# Patient Record
Sex: FEMALE | Race: WHITE | Employment: FULL TIME | URBAN - METROPOLITAN AREA
[De-identification: names, ages, dates, MRNs, and addresses within clinical notes are randomized per-mention and may not be internally consistent; named-entity substitution may affect disease eponyms.]

---

## 2018-09-15 ENCOUNTER — HOSPITAL ENCOUNTER (OUTPATIENT)
Dept: MRI IMAGING | Age: 53
Discharge: HOME | End: 2018-09-15
Attending: INTERNAL MEDICINE
Payer: COMMERCIAL

## 2018-09-15 DIAGNOSIS — M48.07 SPINAL STENOSIS OF LUMBOSACRAL REGION: ICD-10-CM

## 2018-09-15 DIAGNOSIS — R29.898 WEAKNESS OF BOTH LEGS: ICD-10-CM

## 2018-09-15 PROCEDURE — 72158 MRI LUMBAR SPINE W/O & W/DYE: CPT

## 2018-09-15 PROCEDURE — 74011250636 HC RX REV CODE- 250/636

## 2018-09-15 PROCEDURE — A9575 INJ GADOTERATE MEGLUMI 0.1ML: HCPCS

## 2018-09-15 RX ORDER — GADOTERATE MEGLUMINE 376.9 MG/ML
14 INJECTION INTRAVENOUS
Status: COMPLETED | OUTPATIENT
Start: 2018-09-15 | End: 2018-09-15

## 2018-09-15 RX ORDER — SODIUM CHLORIDE 0.9 % (FLUSH) 0.9 %
10 SYRINGE (ML) INJECTION
Status: COMPLETED | OUTPATIENT
Start: 2018-09-15 | End: 2018-09-15

## 2018-09-15 RX ADMIN — GADOTERATE MEGLUMINE 14 ML: 376.9 INJECTION INTRAVENOUS at 11:38

## 2018-09-15 RX ADMIN — Medication 10 ML: at 11:38

## 2018-09-15 NOTE — PROGRESS NOTES
Your back looked better than I thought. I don't see any abnormality that would cause leg weakness or benefit from surgery. I certainly see why it hurts, thought, and PT and spine injections might get you relief. Do you want to go back to see Dr Keily Hyatt at the ortho office? You could probably call there and be seen in follow up without a referral.  Let me know if they say you need one. The question remains why you are losing power in your legs. Might this stem from cervical spine disease? Are you having neck pain, or nerve pain in your arms? If so, it would be wise to check an MRI of your neck. Let me know.

## 2018-10-01 ENCOUNTER — HOSPITAL ENCOUNTER (OUTPATIENT)
Dept: PHYSICAL THERAPY | Age: 53
Discharge: HOME | End: 2018-10-01
Payer: COMMERCIAL

## 2018-10-01 PROCEDURE — 97140 MANUAL THERAPY 1/> REGIONS: CPT

## 2018-10-01 PROCEDURE — 97110 THERAPEUTIC EXERCISES: CPT

## 2018-10-01 PROCEDURE — 97161 PT EVAL LOW COMPLEX 20 MIN: CPT

## 2018-10-01 NOTE — THERAPY EVALUATION
[facility] [employer]  : 1965 [facility] at [address]  [phone]   [phone]       OUTPATIENT PHYSICAL THERAPY:Initial Assessment 10/1/2018    ICD-10: Treatment Diagnosis: Cervicalgia (M54.2)   Precautions/Allergies:   Adhesive; Iodine; and Nickel   Fall Risk Score: 1 (? 5 = High Risk)  MD Orders: Eval and Treat MEDICAL/REFERRING DIAGNOSIS:  Spondylosis without myelopathy or radiculopathy, cervical region [M47.812]  Spondylolisthesis, site unspecified [M43.10]  Radiculopathy, site unspecified [M54.10]   DATE OF ONSET: six months prior  REFERRING PHYSICIAN: Leidy Flores,*  RETURN PHYSICIAN APPOINTMENT: TBD by patient      INITIAL ASSESSMENT:  Ms. Dawson Andujar has attended 1 physical therapy session including initial evaluation. Dawson Andujar presents with S/S of increased pain, decreased ROM, decreased strength, decreased functional tolerance consistent with S/S of right periscapular strain. Dawson Andujar will benefit from home exercise program, therapeutic and postural strengthening exercises, manual therapeutic techniques (ie. Distraction, SOR, myofascial release/soft tissue mobilization) as appropriate to address Carol Mireles Porfirio's current condition. Dawson Andujar will benefit from skilled PT (medically necessary) to address above deficits affecting participation in basic ADLs and overall functional tolerance. PROBLEM LIST (Impacting functional limitations):  1. Decreased Strength  2. Decreased ADL/Functional Activities  3. Decreased Transfer Abilities  4. Increased Pain  5. Decreased Activity Tolerance  6. Decreased Flexibility/Joint Mobility  7. Decreased Knowledge of Precautions  8. Decreased [place] with Home Exercise Program INTERVENTIONS PLANNED:  1. Balance Exercise  2. Bed Mobility  3. Cold  4. Cryotherapy  5. Heat  6. Home Exercise Program (HEP)  7.  Manual Therapy  8. Neuromuscular Re-education/Strengthening  9. Range of Motion (ROM)  10. Therapeutic Activites  11. Therapeutic Exercise/Strengthening  12. Transfer Training   TREATMENT PLAN:  Effective Dates: 10/1/2018 TO 1/1/2019. Frequency/Duration: up to 2 times a week for 12 weeks   GOALS: (Goals have been discussed and agreed upon with patient.)  SHORT-TERM FUNCTIONAL GOALS: Time Frame: 4 weeks  1. 62 Daphne Hoang will report <=2/10 pain to cervical spine with performance of functional spinal mobility and rotation. 2.  62 Daphne Hoang will demonstrate improved NDI score, indicating spinal improvement from 10/50 to 5/50 affecting minimal to no difficulty with performance of cervical mobility and strengthening. 3.  62 Daphne Hoang to be independent with initial HEP for cervical region and UE's.   4.  62 Daphne Hoang will improve ROM to >=90% to assist with improved function during instrumental activities of daily living. V[place]ie[place]ary will improve MMT to >=4+/5 to all UE strengthening to return to PLOF and improve functional tolerance. DISCHARGE GOALS: Time Frame: 8 weeks  1. 62 Daphne Hoang will report <=0/10 pain to cervical spine with performance of functional spinal mobility and rotation and minimal to no difficulty with such tasks. 2. 62 Daphne Hoang will demonstrate improved NDI score, indicating spinal improvement from 5/50 to 0/50 affecting minimal to no difficulty with performance of cervical mobility and strengthening. [place] to be independent with advanced HEP for cervical region and UE's. Rehabilitation Potential For Stated Goals: Good  Regarding Whitney Monroy's therapy, I certify that the treatment plan above will be carried out by a therapist or under their direction.   Thank you for this referral,  Sheridan Najera PT     Referring Physician Signature: Kylee Lu,*              Date                    HISTORY:   History of Present Injury/Illness (Reason for Referral):  Ms. Ish Dickerson reports that she began having right sided neck and scapular pain with sleep, driving, pilates, and cycling. She reports that her symptoms are increased with overhead activity and turning her neck and decreased with pain medication. She reports sleep disturbance of one to two hours nightly. She has tried chiropractic and massage with short term improvement in pain. Her goals are to be able to safely return to regular exercise and to be safe with activity. Past Medical History/Comorbidities:   Ms. Ish Dickerson  has a past medical history of Agatston coronary artery calcium score less than 100 (11/16/2011); FH: thyroid disease (4/12/2013); History of melanoma (4/12/2013); HTN (hypertension) (9/12/2012); Hyperlipidemia (10/9/2015); Hypertension; Overweight(278.02) (4/12/2013); Spinal stenosis of lumbar region (4/12/2013); and Trigeminal neuralgia (4/12/2013). Ms. Ish Dickerson  has a past surgical history that includes hx orthopaedic; pr breast surgery procedure unlisted; hx tonsillectomy; hx breast biopsy; hx other surgical (Feb 2014); and hx other surgical (2014). Social History/Living Environment:     lives in private residence with spouse  Prior Level of Function/Work/Activity:  Independent with exercise and community activities prior to recent symptom exacerbation. Current Medications:    Current Outpatient Prescriptions:     naproxen (NAPROSYN) 500 mg tablet, Take 1 Tab by mouth two (2) times daily (with meals). , Disp: 60 Tab, Rfl: 1    propranolol (INDERAL) 10 mg tablet, Take 1 Tab by mouth daily as needed. , Disp: 30 Tab, Rfl: 3    ALPRAZolam (XANAX) 0.5 mg tablet, Take 1 Tab by mouth nightly as needed.  Max Daily Amount: 0.5 mg., Disp: 30 Tab, Rfl: 5    valACYclovir (VALTREX) 1 gram tablet, TAKE ONE TABLET BY MOUTH TWICE A DAY AS NEEDED, Disp: 60 Tab, Rfl: 1    naltrexone-buPROPion (CONTRAVE) 8-90 mg TbER ER tablet, TAKE TWO TABLETS BY MOUTH TWICE A DAY, Disp: 120 Tab, Rfl: 5    OTHER, Indications: airborne origional, Disp: , Rfl:     docusate sodium (STOOL SOFTENER) 100 mg capsule, Take 100 mg by mouth two (2) times a day., Disp: , Rfl:     hydroCHLOROthiazide (HYDRODIURIL) 25 mg tablet, Take 1 Tab by mouth daily. , Disp: 90 Tab, Rfl: 3    potassium chloride (KLOR-CON) 10 mEq tablet, Take 1 Tab by mouth daily. , Disp: 90 Tab, Rfl: 3    losartan (COZAAR) 50 mg tablet, Take 1 Tab by mouth daily. , Disp: 90 Tab, Rfl: 3    ciclopirox (PENLAC) 8 % solution, Apply to nails daily after bathing, Disp: 6.6 mL, Rfl: 5    hydrocortisone valerate (WESTCORT) 0.2 % topical cream, APPLY TOPICALLY TO RASH ON FACE TWO TIMES A DAY UNTIL CLEAR THEN AS NEEDED FOR FLARES, Disp: , Rfl: 3    cholecalciferol (VITAMIN D3) 1,000 unit cap, Take 1 Cap by mouth daily. , Disp: , Rfl:     fish oil-omega-3 fatty acids 340-1,000 mg capsule, Take  by mouth daily. , Disp: , Rfl:     norethindrone-e.estradiol-iron (LO LOESTRIN FE) 1 mg-10 mcg (24)/10 mcg (2) tab, Take one tablet by mouth daily, Disp: 3 Package, Rfl: 3     Date Last Reviewed:  10/1/2018   Number of Personal Factors/Comorbidities that affect the Plan of Care: 3+: HIGH COMPLEXITY   EXAMINATION:   Observation/Orthostatic Postural Assessment: Forward head posture with bilateral rounded shoulders and left periscapular winging. Limited pec minor mobility, limited upper trap mobility, limited levator scapulae mobility that is more significant on the right t  Palpation:          TTP over the right pec minor insertion, right upper trap, and right mid-horacic paraspinals, along medial scapular border.  Limited PA glides in mid thoracic, upper thoracic, and cervical right 3-4, 4-5, 5-6  ROM:    Joint: Eval Date: 10/1/2018 Re-Assess Date: Re-Assess Date:         Cervical AROM      Flexion (% of normal): 50     Extension (% of normal): 50     L sidebending (% of normal): 75     R sidebending (% of normal): 50     L rotation (% of normal): 75     R rotation (% of normal): 50                 Pain at end-range or with AROM? Yes/No On right     Any pain with overpressure? Yes/No Yes on right       Strength:    Joint: Eval Date: 10/1/2018 Re-Assess Date:  Re-Assess Date:     RIGHT LEFT RIGHT LEFT RIGHT LEFT   Shoulder flexion:  4/5 5/5       Shoulder extension: 5/5 5/5       Shoulder abduction: 4/5 5/5       Shoulder IR: 5/5 5/5       Shoulder ER: 4/5 5/5       Scapular retraction 4/5 5/5       Horizontal abduction 4/5 5/5       Scapular elevation 4/5 5/5           Special Tests: Assessed @ Initial Visit    Spurling's Test: Positive on right. Distraction test: positve              neers impingement test     Neurological Screen: Radiating symptoms? Yes, into right shoulder blade   Reflexes and myotomes equal for bilateral UE. Functional Mobility:  Assessed @ Initial Visit. Pain with supine to sit in right scapula        Body Structures Involved:  1. Nerves  2. Joints  3. Muscles  4. Ligaments Body Functions Affected:  1. Sensory/Pain  2. Neuromusculoskeletal  3. Movement Related Activities and Participation Affected:  1. General Tasks and Demands  2. Mobility  3. Self Care  4. Domestic Life  5. Interpersonal Interactions and Relationships  6. Community, Social and [place]   Number of elements that affect the Plan of Care: 3: MODERATE COMPLEXITY   CLINICAL PRESENTATION:   Presentation: Stable and uncomplicated: LOW COMPLEXITY   CLINICAL DECISION MAKING:   Outcome Measure: Tool Used: Neck Disability Index (NDI)  Score:  Initial: 10/50  Most Recent: X/50 (Date: -- )   Interpretation of Score: The Neck Disability Index is a revised form of the Oswestry Low Back Pain Index and is designed to measure the activities of daily living in person's with neck pain. Each section is scored on a 0-5 scale, 5 representing the greatest disability. The scores of each section are added together for a total score of 50.    Score 0 1-10 11-20 21-30 31-40 41-49 50   Modifier CH CI CJ CK CL CM CN         Medical Necessity:   · Skilled intervention continues to be required due to address above deficits affecting participation in basic ADLs and overall functional tolerance. Reason for Services/Other Comments:  · Patient continues to require skilled intervention due to address above deficits affecting participation in basic ADLs and overall functional tolerance. Use of outcome tool(s) and clinical judgement create a POC that gives a: Clear prediction of patient's progress: LOW COMPLEXITY   TREATMENT:   (In addition to Assessment/Re-Assessment sessions the following treatments were rendered)  THERAPEUTIC EXERCISE: (10 minutes):  Exercises per grid below to improve mobility, strength, balance and coordination. Required minimal visual and verbal cues to promote proper body alignment and promote proper body posture. Progressed resistance, range, repetitions and complexity of movement as indicated. Date:  10/1/2018 Date:   Date:     Activity/Exercise Parameters Parameters Parameters   Levator stretch x5'     Doorway pec stretch x5'                                         MANUAL THERAPY: (15 minutes): Joint mobilization, Soft tissue mobilization and Manipulation was utilized and necessary because of the patient's restricted joint motion, painful spasm, loss of articular motion and restricted motion of soft tissue. Instrument assisted soft tissue mobilization to right periscapular region with deep pressure release for right levator. Supine manual cervical traction and sub occipital release. MODALITIES: (10 minutes):      *  Cold Pack Therapy in order to provide analgesia and relieve muscle spasm. to right periscapular region.   (Used abbreviations: MET - muscle energy technique; PNF - proprioceptive neuromuscular facilitation; NMR - neuromuscular re-education; AP - anterior to posterior; PA - posterior to anterior)    Pre-Treatment Assessment:See patient history. Treatment/Session Assessment: Patient had decreased pain with ROM after treatm  · Pain/ Symptoms: Initial:   4 Post Session:  3 ·   Compliance with Program/Exercises: Will assess as treatment progresses. · Recommendations/Intent for next treatment session: \"Next visit will focus on advancements to more challenging activities and reduction in assistance provided\".   Total Treatment Duration:  PT Patient Time In/Time Out  Time In: 1515  Time Out: [address], PT

## 2018-10-09 ENCOUNTER — HOSPITAL ENCOUNTER (OUTPATIENT)
Dept: PHYSICAL THERAPY | Age: 53
Discharge: HOME | End: 2018-10-09
Payer: COMMERCIAL

## 2018-10-09 PROCEDURE — 97110 THERAPEUTIC EXERCISES: CPT

## 2018-10-09 PROCEDURE — 97140 MANUAL THERAPY 1/> REGIONS: CPT

## 2018-10-09 NOTE — PROGRESS NOTES
Ambulatory/Rehab Services H2 Model Falls Risk Assessment    Risk Factor Pts. ·   Confusion/Disorientation/Impulsivity  []    4 ·   Symptomatic Depression  []   2 ·   Altered Elimination  []   1 ·   Dizziness/Vertigo  []   1 ·   Gender (Male)  []   1 ·   Any administered antiepileptics (anticonvulsants):  []   2 ·   Any administered benzodiazepines:  []   1 ·   Visual Impairment (specify):  []   1 ·   Portable Oxygen Use  []   1 ·   Orthostatic ? BP  []   1 ·   History of Recent Falls (within 3 mos.)  []   5     Ability to Rise from Chair (choose one) Pts. ·   Ability to rise in a single movement  []   0 ·   Pushes up, successful in one attempt  [x]   1 ·   Multiple attempts, but successful  []   3 ·   Unable to rise without assistance  []   4   Total: (5 or greater = High Risk) 1     Falls Prevention Plan:   []                Physical Limitations to Exercise (specify):   []                Mobility Assistance Device (type):   []                Exercise/Equipment Adaptation (specify):    ©2010 [facility] [employer] of [address] Patent #[id number].  Federal Law prohibits the replication, distribution or use without written permission from [employer] [employer]

## 2018-10-09 NOTE — THERAPY EVALUATION
[employer]  : 1965 [facility] at [address]  [phone]   [phone]       OUTPATIENT PHYSICAL THERAPY:Initial Assessment 10/9/2018    ICD-10: Treatment Diagnosis: Cervicalgia (M54.2)   Precautions/Allergies:   Adhesive; Iodine; and Nickel   Fall Risk Score: 1 (? 5 = High Risk)  MD Orders: Eval and Treat MEDICAL/REFERRING DIAGNOSIS:  Spondylosis without myelopathy or radiculopathy, cervical region [M47.812]  Spondylolisthesis, site unspecified [M43.10]  Radiculopathy, site unspecified [M54.10]   DATE OF ONSET: six months prior  REFERRING PHYSICIAN: Claudean Birk,*  RETURN PHYSICIAN APPOINTMENT: TBD by patient      INITIAL ASSESSMENT:  Ms. Rhina García has attended 1 physical therapy session including initial evaluation. Rhina García presents with S/S of increased pain, decreased ROM, decreased strength, decreased functional tolerance consistent with S/S of right periscapular strain. Rhina García will benefit from home exercise program, therapeutic and postural strengthening exercises, manual therapeutic techniques (ie. Distraction, SOR, myofascial release/soft tissue mobilization) as appropriate to address [employer]'s current condition. Rhina García will benefit from skilled PT (medically necessary) to address above deficits affecting participation in basic ADLs and overall functional tolerance. PROBLEM LIST (Impacting functional limitations):  1. Decreased Strength  2. Decreased ADL/Functional Activities  3. Decreased Transfer Abilities  4. Increased Pain  5. Decreased Activity Tolerance  6. Decreased Flexibility/Joint Mobility  7. Decreased Knowledge of Precautions  8. Decreased [place] with Home Exercise Program INTERVENTIONS PLANNED:  1. Balance Exercise  2. Bed Mobility  3. Cold  4. Cryotherapy  5. Heat  6. Home Exercise Program (HEP)  7.  Manual Therapy  8. Neuromuscular Re-education/Strengthening  9. Range of Motion (ROM)  10. Therapeutic Activites  11. Therapeutic Exercise/Strengthening  12. Transfer Training   TREATMENT PLAN:  Effective Dates: 10/1/2018 TO 1/1/2019. Frequency/Duration: up to 2 times a week for 12 weeks   GOALS: (Goals have been discussed and agreed upon with patient.)  SHORT-TERM FUNCTIONAL GOALS: Time Frame: 4 weeks  1. 62 Daphne Hoang will report <=2/10 pain to cervical spine with performance of functional spinal mobility and rotation. 2.  62 Daphne Hoang will demonstrate improved NDI score, indicating spinal improvement from 10/50 to 5/50 affecting minimal to no difficulty with performance of cervical mobility and strengthening. 3.  62 Daphne Hoang to be independent with initial HEP for cervical region and UE's.   4.  62 Daphne Hoang will improve ROM to >=90% to assist with improved function during instrumental activities of daily living. Radha will improve MMT to >=4+/5 to all UE strengthening to return to PLOF and improve functional tolerance. DISCHARGE GOALS: Time Frame: 8 weeks  1. 62 Daphne Hoang will report <=0/10 pain to cervical spine with performance of functional spinal mobility and rotation and minimal to no difficulty with such tasks. 2. 62 Daphne Hoang will demonstrate improved NDI score, indicating spinal improvement from 5/50 to 0/50 affecting minimal to no difficulty with performance of cervical mobility and strengthening. [place] to be independent with advanced HEP for cervical region and UE's. Rehabilitation Potential For Stated Goals: Good  Regarding Carol Monroy's therapy, I certify that the treatment plan above will be carried out by a therapist or under their direction.   Thank you for this referral,  Werner Castro, PT     Referring Physician Signature: Leidy Flores,*              Date                    HISTORY:   History of Present Injury/Illness (Reason for Referral):  Ms. Levy Mohs reports that she began having right sided neck and scapular pain with sleep, driving, pilates, and cycling. She reports that her symptoms are increased with overhead activity and turning her neck and decreased with pain medication. She reports sleep disturbance of one to two hours nightly. She has tried chiropractic and massage with short term improvement in pain. Her goals are to be able to safely return to regular exercise and to be safe with activity. Past Medical History/Comorbidities:   Ms. Levy Mohs  has a past medical history of Agatston coronary artery calcium score less than 100 (11/16/2011); FH: thyroid disease (4/12/2013); History of melanoma (4/12/2013); HTN (hypertension) (9/12/2012); Hyperlipidemia (10/9/2015); Hypertension; Overweight(278.02) (4/12/2013); Spinal stenosis of lumbar region (4/12/2013); and Trigeminal neuralgia (4/12/2013). Ms. Levy Mohs  has a past surgical history that includes hx orthopaedic; pr breast surgery procedure unlisted; hx tonsillectomy; hx breast biopsy; hx other surgical (Feb 2014); and hx other surgical (2014). Social History/Living Environment:     lives in private residence with spouse  Prior Level of Function/Work/Activity:  Independent with exercise and community activities prior to recent symptom exacerbation. Current Medications:    Current Outpatient Prescriptions:     naproxen (NAPROSYN) 500 mg tablet, Take 1 Tab by mouth two (2) times daily (with meals). , Disp: 60 Tab, Rfl: 1    propranolol (INDERAL) 10 mg tablet, Take 1 Tab by mouth daily as needed. , Disp: 30 Tab, Rfl: 3    ALPRAZolam (XANAX) 0.5 mg tablet, Take 1 Tab by mouth nightly as needed.  Max Daily Amount: 0.5 mg., Disp: 30 Tab, Rfl: 5    valACYclovir (VALTREX) 1 gram tablet, TAKE ONE TABLET BY MOUTH TWICE A DAY AS NEEDED, Disp: 60 Tab, Rfl: 1    naltrexone-buPROPion (CONTRAVE) 8-90 mg TbER ER tablet, TAKE TWO TABLETS BY MOUTH TWICE A DAY, Disp: 120 Tab, Rfl: 5    OTHER, Indications: airborne origional, Disp: , Rfl:     docusate sodium (STOOL SOFTENER) 100 mg capsule, Take 100 mg by mouth two (2) times a day., Disp: , Rfl:     hydroCHLOROthiazide (HYDRODIURIL) 25 mg tablet, Take 1 Tab by mouth daily. , Disp: 90 Tab, Rfl: 3    potassium chloride (KLOR-CON) 10 mEq tablet, Take 1 Tab by mouth daily. , Disp: 90 Tab, Rfl: 3    losartan (COZAAR) 50 mg tablet, Take 1 Tab by mouth daily. , Disp: 90 Tab, Rfl: 3    ciclopirox (PENLAC) 8 % solution, Apply to nails daily after bathing, Disp: 6.6 mL, Rfl: 5    hydrocortisone valerate (WESTCORT) 0.2 % topical cream, APPLY TOPICALLY TO RASH ON FACE TWO TIMES A DAY UNTIL CLEAR THEN AS NEEDED FOR FLARES, Disp: , Rfl: 3    cholecalciferol (VITAMIN D3) 1,000 unit cap, Take 1 Cap by mouth daily. , Disp: , Rfl:     fish oil-omega-3 fatty acids 340-1,000 mg capsule, Take  by mouth daily. , Disp: , Rfl:     norethindrone-e.estradiol-iron (LO LOESTRIN FE) 1 mg-10 mcg (24)/10 mcg (2) tab, Take one tablet by mouth daily, Disp: 3 Package, Rfl: 3     Date Last Reviewed:  10/9/2018   Number of Personal Factors/Comorbidities that affect the Plan of Care: 3+: HIGH COMPLEXITY   EXAMINATION:   Observation/Orthostatic Postural Assessment: Forward head posture with bilateral rounded shoulders and left periscapular winging. Limited pec minor mobility, limited upper trap mobility, limited levator scapulae mobility that is more significant on the right t  Palpation:          TTP over the right pec minor insertion, right upper trap, and right mid-horacic paraspinals, along medial scapular border.  Limited PA glides in mid thoracic, upper thoracic, and cervical right 3-4, 4-5, 5-6  ROM:    Joint: Eval Date: 10/1/2018 Re-Assess Date: Re-Assess Date:         Cervical AROM      Flexion (% of normal): 50     Extension (% of normal): 50     L sidebending (% of normal): 75     R sidebending (% of normal): 50     L rotation (% of normal): 75     R rotation (% of normal): 50                 Pain at end-range or with AROM? Yes/No On right     Any pain with overpressure? Yes/No Yes on right       Strength:    Joint: Eval Date: 10/1/2018 Re-Assess Date:  Re-Assess Date:     RIGHT LEFT RIGHT LEFT RIGHT LEFT   Shoulder flexion:  4/5 5/5       Shoulder extension: 5/5 5/5       Shoulder abduction: 4/5 5/5       Shoulder IR: 5/5 5/5       Shoulder ER: 4/5 5/5       Scapular retraction 4/5 5/5       Horizontal abduction 4/5 5/5       Scapular elevation 4/5 5/5           Special Tests: Assessed @ Initial Visit    Spurling's Test: Positive on right. Distraction test: positve              neers impingement test     Neurological Screen: Radiating symptoms? Yes, into right shoulder blade   Reflexes and myotomes equal for bilateral UE. Functional Mobility:  Assessed @ Initial Visit. Pain with supine to sit in right scapula        Body Structures Involved:  1. Nerves  2. Joints  3. Muscles  4. Ligaments Body Functions Affected:  1. Sensory/Pain  2. Neuromusculoskeletal  3. Movement Related Activities and Participation Affected:  1. General Tasks and Demands  2. Mobility  3. Self Care  4. Domestic Life  5. Interpersonal Interactions and Relationships  6. Community, Social and [facility]   Number of elements that affect the Plan of Care: 3: MODERATE COMPLEXITY   CLINICAL PRESENTATION:   Presentation: Stable and uncomplicated: LOW COMPLEXITY   CLINICAL DECISION MAKING:   Outcome Measure: Tool Used: Neck Disability Index (NDI)  Score:  Initial: 10/50  Most Recent: X/50 (Date: -- )   Interpretation of Score: The Neck Disability Index is a revised form of the Oswestry Low Back Pain Index and is designed to measure the activities of daily living in person's with neck pain. Each section is scored on a 0-5 scale, 5 representing the greatest disability. The scores of each section are added together for a total score of 50.    Score 0 1-10 11-20 21-30 31-40 41-49 50   Modifier CH CI CJ CK CL CM CN         Medical Necessity:   · Skilled intervention continues to be required due to address above deficits affecting participation in basic ADLs and overall functional tolerance. Reason for Services/Other Comments:  · Patient continues to require skilled intervention due to address above deficits affecting participation in basic ADLs and overall functional tolerance. Use of outcome tool(s) and clinical judgement create a POC that gives a: Clear prediction of patient's progress: LOW COMPLEXITY   TREATMENT:   (In addition to Assessment/Re-Assessment sessions the following treatments were rendered)  THERAPEUTIC EXERCISE: (15 minutes):  Exercises per grid below to improve mobility, strength, balance and coordination. Required minimal visual and verbal cues to promote proper body alignment and promote proper body posture. Progressed resistance, range, repetitions and complexity of movement as indicated. Date:  10/1/2018 Date:  10/9/2018 Date:     Activity/Exercise Parameters Parameters Parameters   Levator stretch x5' x5'    Doorway pec stretch x5' x5'    UBE  3'/3'                                  MANUAL THERAPY: (25 minutes): Joint mobilization, Soft tissue mobilization and Manipulation was utilized and necessary because of the patient's restricted joint motion, painful spasm, loss of articular motion and restricted motion of soft tissue. Instrument assisted soft tissue mobilization to right periscapular region with deep pressure release for right levator. Supine manual cervical traction and sub occipital release. MODALITIES: (10 minutes):      *  Cold Pack Therapy in order to provide analgesia and relieve muscle spasm. to right periscapular region.   (Used abbreviations: MET - muscle energy technique; PNF - proprioceptive neuromuscular facilitation; NMR - neuromuscular re-education; AP - anterior to posterior; PA - posterior to anterior)    Pre-Treatment Assessment: Patient reports improved general pain levels. Treatment/Session Assessment: Patient had decreased pain with ROM after treatment. Patient reported increased soreness after UBE that resolved with manual.  · Pain/ Symptoms: Initial:   4 Post Session:  3 ·   Compliance with Program/Exercises: Will assess as treatment progresses. · Recommendations/Intent for next treatment session: \"Next visit will focus on advancements to more challenging activities and reduction in assistance provided\".   Total Treatment Duration:  PT Patient Time In/Time Out  Time In: 1600  Time Out: 1208 [facility],

## 2018-10-10 ENCOUNTER — HOSPITAL ENCOUNTER (OUTPATIENT)
Dept: PHYSICAL THERAPY | Age: 53
Discharge: HOME | End: 2018-10-10
Payer: COMMERCIAL

## 2018-10-10 PROCEDURE — 97140 MANUAL THERAPY 1/> REGIONS: CPT

## 2018-10-10 PROCEDURE — 97110 THERAPEUTIC EXERCISES: CPT

## 2018-10-16 ENCOUNTER — HOSPITAL ENCOUNTER (OUTPATIENT)
Dept: PHYSICAL THERAPY | Age: 53
Discharge: HOME | End: 2018-10-16
Payer: COMMERCIAL

## 2018-10-16 NOTE — PROGRESS NOTES
[facility] [employer]  : 1965 [facility] at [address]  [phone]   [phone]       OUTPATIENT PHYSICAL THERAPY: 10/16/2018    ICD-10: Treatment Diagnosis: Cervicalgia (M54.2)   Precautions/Allergies:   Adhesive; Iodine; and Nickel   Fall Risk Score: 1 (? 5 = High Risk)  MD Orders: Eval and Treat MEDICAL/REFERRING DIAGNOSIS:  Spondylosis without myelopathy or radiculopathy, cervical region [M47.812]  Spondylolisthesis, site unspecified [M43.10]  Radiculopathy, site unspecified [M54.10]   DATE OF ONSET: six months prior  REFERRING PHYSICIAN: Nataly Barber,*  RETURN PHYSICIAN APPOINTMENT: TBD by patient      INITIAL ASSESSMENT:  Ms. Parth Lux has attended 1 physical therapy session including initial evaluation. Parth Lux presents with S/S of increased pain, decreased ROM, decreased strength, decreased functional tolerance consistent with S/S of right periscapular strain. Parth Lux will benefit from home exercise program, therapeutic and postural strengthening exercises, manual therapeutic techniques (ie. Distraction, SOR, myofascial release/soft tissue mobilization) as appropriate to address Nati Monroy's current condition. Parth Lux will benefit from skilled PT (medically necessary) to address above deficits affecting participation in basic ADLs and overall functional tolerance. PROBLEM LIST (Impacting functional limitations):  1. Decreased Strength  2. Decreased ADL/Functional Activities  3. Decreased Transfer Abilities  4. Increased Pain  5. Decreased Activity Tolerance  6. Decreased Flexibility/Joint Mobility  7. Decreased Knowledge of Precautions  8. Decreased [place] with Home Exercise Program INTERVENTIONS PLANNED:  1. Balance Exercise  2. Bed Mobility  3. Cold  4. Cryotherapy  5. Heat  6. Home Exercise Program (HEP)  7. Manual Therapy  8.  Neuromuscular Re-education/Strengthening  9. Range of Motion (ROM)  10. Therapeutic Activites  11. Therapeutic Exercise/Strengthening  12. Transfer Training   TREATMENT PLAN:  Effective Dates: 10/1/2018 TO 1/1/2019. Frequency/Duration: up to 2 times a week for 12 weeks   GOALS: (Goals have been discussed and agreed upon with patient.)  SHORT-TERM FUNCTIONAL GOALS: Time Frame: 4 weeks  1. 62 Daphne Hoang will report <=2/10 pain to cervical spine with performance of functional spinal mobility and rotation. 2.  62 Daphne Hoang will demonstrate improved NDI score, indicating spinal improvement from 10/50 to 5/50 affecting minimal to no difficulty with performance of cervical mobility and strengthening. 3.  62 Daphne Hoang to be independent with initial HEP for cervical region and UE's.   4.  62 Daphne Hoang will improve ROM to >=90% to assist with improved function during instrumental activities of daily living. Vamsieyzackary will improve MMT to >=4+/5 to all UE strengthening to return to PLOF and improve functional tolerance. DISCHARGE GOALS: Time Frame: 8 weeks  1. Beryl Hoang will report <=0/10 pain to cervical spine with performance of functional spinal mobility and rotation and minimal to no difficulty with such tasks. 2. 62 Daphne Hoang will demonstrate improved NDI score, indicating spinal improvement from 5/50 to 0/50 affecting minimal to no difficulty with performance of cervical mobility and strengthening. [place] to be independent with advanced HEP for cervical region and UE's. Rehabilitation Potential For Stated Goals: Good  Regarding Charmaine Monroy's therapy, I certify that the treatment plan above will be carried out by a therapist or under their direction.   Thank you for this referral,  Theresa Holley, PT     Referring Physician Signature: Bell Vallejo,*              Date                    HISTORY:   History of Present Injury/Illness (Reason for Referral):  Ms. Raissa Liao reports that she began having right sided neck and scapular pain with sleep, driving, pilates, and cycling. She reports that her symptoms are increased with overhead activity and turning her neck and decreased with pain medication. She reports sleep disturbance of one to two hours nightly. She has tried chiropractic and massage with short term improvement in pain. Her goals are to be able to safely return to regular exercise and to be safe with activity. Past Medical History/Comorbidities:   Ms. Raissa Liao  has a past medical history of [place] coronary artery calcium score less than 100 (11/16/2011); FH: thyroid disease (4/12/2013); History of melanoma (4/12/2013); HTN (hypertension) (9/12/2012); Hyperlipidemia (10/9/2015); Hypertension; Overweight(278.02) (4/12/2013); Spinal stenosis of lumbar region (4/12/2013); and Trigeminal neuralgia (4/12/2013). Ms. Raissa Liao  has a past surgical history that includes hx orthopaedic; pr breast surgery procedure unlisted; hx tonsillectomy; hx breast biopsy; hx other surgical (Feb 2014); and hx other surgical (2014). Social History/Living Environment:     lives in private residence with spouse  Prior Level of Function/Work/Activity:  Independent with exercise and community activities prior to recent symptom exacerbation. Current Medications:    Current Outpatient Prescriptions:     naproxen (NAPROSYN) 500 mg tablet, Take 1 Tab by mouth two (2) times daily (with meals). , Disp: 60 Tab, Rfl: 1    propranolol (INDERAL) 10 mg tablet, Take 1 Tab by mouth daily as needed. , Disp: 30 Tab, Rfl: 3    ALPRAZolam (XANAX) 0.5 mg tablet, Take 1 Tab by mouth nightly as needed.  Max Daily Amount: 0.5 mg., Disp: 30 Tab, Rfl: 5    valACYclovir (VALTREX) 1 gram tablet, TAKE ONE TABLET BY MOUTH TWICE A DAY AS NEEDED, Disp: 60 Tab, Rfl: 1    naltrexone-buPROPion (CONTRAVE) 8-90 mg TbER ER tablet, TAKE TWO TABLETS BY MOUTH TWICE A DAY, Disp: 120 Tab, Rfl: 5   OTHER, Indications: airborne origional, Disp: , Rfl:     docusate sodium (STOOL SOFTENER) 100 mg capsule, Take 100 mg by mouth two (2) times a day., Disp: , Rfl:     hydroCHLOROthiazide (HYDRODIURIL) 25 mg tablet, Take 1 Tab by mouth daily. , Disp: 90 Tab, Rfl: 3    potassium chloride (KLOR-CON) 10 mEq tablet, Take 1 Tab by mouth daily. , Disp: 90 Tab, Rfl: 3    losartan (COZAAR) 50 mg tablet, Take 1 Tab by mouth daily. , Disp: 90 Tab, Rfl: 3    ciclopirox (PENLAC) 8 % solution, Apply to nails daily after bathing, Disp: 6.6 mL, Rfl: 5    hydrocortisone valerate (WESTCORT) 0.2 % topical cream, APPLY TOPICALLY TO RASH ON FACE TWO TIMES A DAY UNTIL CLEAR THEN AS NEEDED FOR FLARES, Disp: , Rfl: 3    cholecalciferol (VITAMIN D3) 1,000 unit cap, Take 1 Cap by mouth daily. , Disp: , Rfl:     fish oil-omega-3 fatty acids 340-1,000 mg capsule, Take  by mouth daily. , Disp: , Rfl:     norethindrone-e.estradiol-iron (LO LOESTRIN FE) 1 mg-10 mcg (24)/10 mcg (2) tab, Take one tablet by mouth daily, Disp: 3 Package, Rfl: 3     Date Last Reviewed:  10/16/2018   Number of Personal Factors/Comorbidities that affect the Plan of Care: 3+: HIGH COMPLEXITY   EXAMINATION:   Observation/Orthostatic Postural Assessment: Forward head posture with bilateral rounded shoulders and left periscapular winging. Limited pec minor mobility, limited upper trap mobility, limited levator scapulae mobility that is more significant on the right t  Palpation:          TTP over the right pec minor insertion, right upper trap, and right mid-horacic paraspinals, along medial scapular border.  Limited PA glides in mid thoracic, upper thoracic, and cervical right 3-4, 4-5, 5-6  ROM:    Joint: Eval Date: 10/1/2018 Re-Assess Date: Re-Assess Date:         Cervical AROM      Flexion (% of normal): 50     Extension (% of normal): 50     L sidebending (% of normal): 75     R sidebending (% of normal): 50     L rotation (% of normal): 75     R rotation (% of normal): 50                 Pain at end-range or with AROM? Yes/No On right     Any pain with overpressure? Yes/No Yes on right       Strength:    Joint: Eval Date: 10/1/2018 Re-Assess Date:  Re-Assess Date:     RIGHT LEFT RIGHT LEFT RIGHT LEFT   Shoulder flexion:  4/5 5/5       Shoulder extension: 5/5 5/5       Shoulder abduction: 4/5 5/5       Shoulder IR: 5/5 5/5       Shoulder ER: 4/5 5/5       Scapular retraction 4/5 5/5       Horizontal abduction 4/5 5/5       Scapular elevation 4/5 5/5           Special Tests: Assessed @ Initial Visit    Spurling's Test: Positive on right. Distraction test: positve              neers impingement test     Neurological Screen: Radiating symptoms? Yes, into right shoulder blade   Reflexes and myotomes equal for bilateral UE. Functional Mobility:  Assessed @ Initial Visit. Pain with supine to sit in right scapula        Body Structures Involved:  1. Nerves  2. Joints  3. Muscles  4. Ligaments Body Functions Affected:  1. Sensory/Pain  2. Neuromusculoskeletal  3. Movement Related Activities and Participation Affected:  1. General Tasks and Demands  2. Mobility  3. Self Care  4. Domestic Life  5. Interpersonal Interactions and Relationships  6. Community, Social and [place]   Number of elements that affect the Plan of Care: 3: MODERATE COMPLEXITY   CLINICAL PRESENTATION:   Presentation: Stable and uncomplicated: LOW COMPLEXITY   CLINICAL DECISION MAKING:   Outcome Measure: Tool Used: Neck Disability Index (NDI)  Score:  Initial: 10/50  Most Recent: X/50 (Date: -- )   Interpretation of Score: The Neck Disability Index is a revised form of the Oswestry Low Back Pain Index and is designed to measure the activities of daily living in person's with neck pain. Each section is scored on a 0-5 scale, 5 representing the greatest disability. The scores of each section are added together for a total score of 50.    Score 0 1-10 11-20 21-30 31-40 41-49 50   Modifier CH CI CJ CK CL CM CN         Medical Necessity:   · Skilled intervention continues to be required due to address above deficits affecting participation in basic ADLs and overall functional tolerance. Reason for Services/Other Comments:  · Patient continues to require skilled intervention due to address above deficits affecting participation in basic ADLs and overall functional tolerance. Use of outcome tool(s) and clinical judgement create a POC that gives a: Clear prediction of patient's progress: LOW COMPLEXITY   TREATMENT:   (In addition to Assessment/Re-Assessment sessions the following treatments were rendered)  THERAPEUTIC EXERCISE: (30 minutes):  Exercises per grid below to improve mobility, strength, balance and coordination. Required minimal visual and verbal cues to promote proper body alignment and promote proper body posture. Progressed resistance, range, repetitions and complexity of movement as indicated. Date:  10/1/2018 Date:  10/9/2018 Date:  10/10/2018   Activity/Exercise Parameters Parameters Parameters   Levator stretch x5' x5' x5'   Doorway pec stretch x5' x5'    UBE  3'/3'    Nu step   x10'   TB scapular extensions   ytb 2x20   b tb ER   rtb 2x20   multiangle isometrics   x10'         MANUAL THERAPY: (15 minutes): Joint mobilization, Soft tissue mobilization and Manipulation was utilized and necessary because of the patient's restricted joint motion, painful spasm, loss of articular motion and restricted motion of soft tissue. Instrument assisted soft tissue mobilization to right periscapular region with deep pressure release for right levator. Supine manual cervical traction and sub occipital release. MODALITIES: (10 minutes):      *  Cold Pack Therapy in order to provide analgesia and relieve muscle spasm. to right periscapular region.   (Used abbreviations: MET - muscle energy technique; PNF - proprioceptive neuromuscular facilitation; NMR - neuromuscular re-education; AP - anterior to posterior; PA - posterior to anterior)    Pre-Treatment Assessment: Patient reports improved general pain levels. Treatment/Session Assessment: Patient had decreased pain with ROM after treatment. Patient reported improved ROM and soreness after therapeutic exercise. .  · Pain/ Symptoms: Initial:   4 Post Session:  3 ·   Compliance with Program/Exercises: Will assess as treatment progresses. · Recommendations/Intent for next treatment session: \"Next visit will focus on advancements to more challenging activities and reduction in assistance provided\".   Total Treatment Duration:  PT Patient Time In/Time Out  Time In: 1605  Time Out: SHYANNE Saenz

## 2018-10-18 ENCOUNTER — HOSPITAL ENCOUNTER (OUTPATIENT)
Dept: PHYSICAL THERAPY | Age: 53
Discharge: HOME | End: 2018-10-18
Payer: COMMERCIAL

## 2018-10-18 PROCEDURE — 97110 THERAPEUTIC EXERCISES: CPT

## 2018-10-18 PROCEDURE — 97140 MANUAL THERAPY 1/> REGIONS: CPT

## 2018-10-18 NOTE — PROGRESS NOTES
[facility] OUTPATIENT DAILY NOTE    NAME/AGE/GENDER: Lucas Plunkett is a 48 y.o. female. DATE: 10/16/2018      Patient cancelled for appointment today due to having to take a friend to ER. Will plan to follow up on next scheduled visit.     Krishna Najera, PT

## 2018-10-18 NOTE — PROGRESS NOTES
Charmaine [employer]  : 1965 [facility] at [address]  [phone]   [phone]       OUTPATIENT PHYSICAL THERAPY: 10/18/2018    ICD-10: Treatment Diagnosis: Cervicalgia (M54.2)   Precautions/Allergies:   Adhesive; Iodine; and Nickel   Fall Risk Score: 1 (? 5 = High Risk)  MD Orders: Eval and Treat MEDICAL/REFERRING DIAGNOSIS:  Spondylosis without myelopathy or radiculopathy, cervical region [M47.812]  Spondylolisthesis, site unspecified [M43.10]  Radiculopathy, site unspecified [M54.10]   DATE OF ONSET: six months prior  REFERRING PHYSICIAN: Bell Vallejo,*  RETURN PHYSICIAN APPOINTMENT: TBD by patient      INITIAL ASSESSMENT:  Ms. Rosalio Joya has attended 1 physical therapy session including initial evaluation. Rosalio Joya presents with S/S of increased pain, decreased ROM, decreased strength, decreased functional tolerance consistent with S/S of right periscapular strain. Rosalio Joya will benefit from home exercise program, therapeutic and postural strengthening exercises, manual therapeutic techniques (ie. Distraction, SOR, myofascial release/soft tissue mobilization) as appropriate to address Charmaine Monroy's current condition. Rosalio Joya will benefit from skilled PT (medically necessary) to address above deficits affecting participation in basic ADLs and overall functional tolerance. PROBLEM LIST (Impacting functional limitations):  1. Decreased Strength  2. Decreased ADL/Functional Activities  3. Decreased Transfer Abilities  4. Increased Pain  5. Decreased Activity Tolerance  6. Decreased Flexibility/Joint Mobility  7. Decreased Knowledge of Precautions  8. Decreased [place] with Home Exercise Program INTERVENTIONS PLANNED:  1. Balance Exercise  2. Bed Mobility  3. Cold  4. Cryotherapy  5. Heat  6. Home Exercise Program (HEP)  7. Manual Therapy  8.  Neuromuscular Re-education/Strengthening  9. Range of Motion (ROM)  10. Therapeutic Activites  11. Therapeutic Exercise/Strengthening  12. Transfer Training   TREATMENT PLAN:  Effective Dates: 10/1/2018 TO 1/1/2019. Frequency/Duration: up to 2 times a week for 12 weeks   GOALS: (Goals have been discussed and agreed upon with patient.)  SHORT-TERM FUNCTIONAL GOALS: Time Frame: 4 weeks  1. 62 Daphne Hoang will report <=2/10 pain to cervical spine with performance of functional spinal mobility and rotation. 2.  62 Daphne Hoang will demonstrate improved NDI score, indicating spinal improvement from 10/50 to 5/50 affecting minimal to no difficulty with performance of cervical mobility and strengthening. 3.  62 Daphne Hoang to be independent with initial HEP for cervical region and UE's.   4.  62 Daphne Hoang will improve ROM to >=90% to assist with improved function during instrumental activities of daily living. Vamsieyzackary will improve MMT to >=4+/5 to all UE strengthening to return to PLOF and improve functional tolerance. DISCHARGE GOALS: Time Frame: 8 weeks  1. Beryl Hoang will report <=0/10 pain to cervical spine with performance of functional spinal mobility and rotation and minimal to no difficulty with such tasks. 2. 62 Daphne Hoang will demonstrate improved NDI score, indicating spinal improvement from 5/50 to 0/50 affecting minimal to no difficulty with performance of cervical mobility and strengthening. [place] to be independent with advanced HEP for cervical region and UE's. Rehabilitation Potential For Stated Goals: Good  Regarding Terrence Rankin Porfirio's therapy, I certify that the treatment plan above will be carried out by a therapist or under their direction.   Thank you for this referral,  Joelle Moyer, PT     Referring Physician Signature: Chalree Torres,*              Date                    HISTORY:   History of Present Injury/Illness (Reason for Referral):  Ms. Mar Galindo reports that she began having right sided neck and scapular pain with sleep, driving, pilates, and cycling. She reports that her symptoms are increased with overhead activity and turning her neck and decreased with pain medication. She reports sleep disturbance of one to two hours nightly. She has tried chiropractic and massage with short term improvement in pain. Her goals are to be able to safely return to regular exercise and to be safe with activity. Past Medical History/Comorbidities:   Ms. Mar Galindo  has a past medical history of Agatston coronary artery calcium score less than 100, FH: thyroid disease, History of melanoma, HTN (hypertension), Hyperlipidemia, Hypertension, Overweight(278.02), Spinal stenosis of lumbar region, and Trigeminal neuralgia. Ms. Mar Galindo  has a past surgical history that includes hx orthopaedic; pr breast surgery procedure unlisted; hx tonsillectomy; hx breast biopsy; hx other surgical (Feb 2014); and hx other surgical (2014). Social History/Living Environment:     lives in private residence with spouse  Prior Level of Function/Work/Activity:  Independent with exercise and community activities prior to recent symptom exacerbation. Current Medications:    Current Outpatient Medications:     naproxen (NAPROSYN) 500 mg tablet, Take 1 Tab by mouth two (2) times daily (with meals). , Disp: 60 Tab, Rfl: 1    propranolol (INDERAL) 10 mg tablet, Take 1 Tab by mouth daily as needed. , Disp: 30 Tab, Rfl: 3    ALPRAZolam (XANAX) 0.5 mg tablet, Take 1 Tab by mouth nightly as needed.  Max Daily Amount: 0.5 mg., Disp: 30 Tab, Rfl: 5    valACYclovir (VALTREX) 1 gram tablet, TAKE ONE TABLET BY MOUTH TWICE A DAY AS NEEDED, Disp: 60 Tab, Rfl: 1    naltrexone-buPROPion (CONTRAVE) 8-90 mg TbER ER tablet, TAKE TWO TABLETS BY MOUTH TWICE A DAY, Disp: 120 Tab, Rfl: 5    OTHER, Indications: airborne origional, Disp: , Rfl:     docusate sodium (STOOL SOFTENER) 100 mg capsule, Take 100 mg by mouth two (2) times a day., Disp: , Rfl:     hydroCHLOROthiazide (HYDRODIURIL) 25 mg tablet, Take 1 Tab by mouth daily. , Disp: 90 Tab, Rfl: 3    potassium chloride (KLOR-CON) 10 mEq tablet, Take 1 Tab by mouth daily. , Disp: 90 Tab, Rfl: 3    losartan (COZAAR) 50 mg tablet, Take 1 Tab by mouth daily. , Disp: 90 Tab, Rfl: 3    ciclopirox (PENLAC) 8 % solution, Apply to nails daily after bathing, Disp: 6.6 mL, Rfl: 5    hydrocortisone valerate (WESTCORT) 0.2 % topical cream, APPLY TOPICALLY TO RASH ON FACE TWO TIMES A DAY UNTIL CLEAR THEN AS NEEDED FOR FLARES, Disp: , Rfl: 3    cholecalciferol (VITAMIN D3) 1,000 unit cap, Take 1 Cap by mouth daily. , Disp: , Rfl:     fish oil-omega-3 fatty acids 340-1,000 mg capsule, Take  by mouth daily. , Disp: , Rfl:     norethindrone-e.estradiol-iron (LO LOESTRIN FE) 1 mg-10 mcg (24)/10 mcg (2) tab, Take one tablet by mouth daily, Disp: 3 Package, Rfl: 3     Date Last Reviewed:  10/18/2018   Number of Personal Factors/Comorbidities that affect the Plan of Care: 3+: HIGH COMPLEXITY   EXAMINATION:   Observation/Orthostatic Postural Assessment: Forward head posture with bilateral rounded shoulders and left periscapular winging. Limited pec minor mobility, limited upper trap mobility, limited levator scapulae mobility that is more significant on the right t  Palpation:          TTP over the right pec minor insertion, right upper trap, and right mid-horacic paraspinals, along medial scapular border. Limited PA glides in mid thoracic, upper thoracic, and cervical right 3-4, 4-5, 5-6  ROM:    Joint: Eval Date: 10/1/2018 Re-Assess Date: Re-Assess Date:         Cervical AROM      Flexion (% of normal): 50     Extension (% of normal): 50     L sidebending (% of normal): 75     R sidebending (% of normal): 50     L rotation (% of normal): 75     R rotation (% of normal): 50                 Pain at end-range or with AROM?  Yes/No On right     Any pain with overpressure? Yes/No Yes on right       Strength:    Joint: Eval Date: 10/1/2018 Re-Assess Date:  Re-Assess Date:     RIGHT LEFT RIGHT LEFT RIGHT LEFT   Shoulder flexion:  4/5 5/5       Shoulder extension: 5/5 5/5       Shoulder abduction: 4/5 5/5       Shoulder IR: 5/5 5/5       Shoulder ER: 4/5 5/5       Scapular retraction 4/5 5/5       Horizontal abduction 4/5 5/5       Scapular elevation 4/5 5/5           Special Tests: Assessed @ Initial Visit    Spurling's Test: Positive on right. Distraction test: positve              neers impingement test     Neurological Screen: Radiating symptoms? Yes, into right shoulder blade   Reflexes and myotomes equal for bilateral UE. Functional Mobility:  Assessed @ Initial Visit. Pain with supine to sit in right scapula        Body Structures Involved:  1. Nerves  2. Joints  3. Muscles  4. Ligaments Body Functions Affected:  1. Sensory/Pain  2. Neuromusculoskeletal  3. Movement Related Activities and Participation Affected:  1. General Tasks and Demands  2. Mobility  3. Self Care  4. Domestic Life  5. Interpersonal Interactions and Relationships  6. Community, Social and [place]   Number of elements that affect the Plan of Care: 3: MODERATE COMPLEXITY   CLINICAL PRESENTATION:   Presentation: Stable and uncomplicated: LOW COMPLEXITY   CLINICAL DECISION MAKING:   Outcome Measure: Tool Used: Neck Disability Index (NDI)  Score:  Initial: 10/50  Most Recent: X/50 (Date: -- )   Interpretation of Score: The Neck Disability Index is a revised form of the Oswestry Low Back Pain Index and is designed to measure the activities of daily living in person's with neck pain. Each section is scored on a 0-5 scale, 5 representing the greatest disability. The scores of each section are added together for a total score of 50.    Score 0 1-10 11-20 21-30 31-40 41-49 50   Modifier CH CI CJ CK CL CM CN         Medical Necessity:   · Skilled intervention continues to be required due to address above deficits affecting participation in basic ADLs and overall functional tolerance. Reason for Services/Other Comments:  · Patient continues to require skilled intervention due to address above deficits affecting participation in basic ADLs and overall functional tolerance. Use of outcome tool(s) and clinical judgement create a POC that gives a: Clear prediction of patient's progress: LOW COMPLEXITY   TREATMENT:   (In addition to Assessment/Re-Assessment sessions the following treatments were rendered)  THERAPEUTIC EXERCISE: (20 minutes):  Exercises per grid below to improve mobility, strength, balance and coordination. Required minimal visual and verbal cues to promote proper body alignment and promote proper body posture. Progressed resistance, range, repetitions and complexity of movement as indicated. Date:  10/1/2018 Date:  10/9/2018 Date:  10/10/2018 Date:  10/18/2018   Activity/Exercise Parameters Parameters Parameters    Levator stretch x5' x5' x5' x5'   Doorway pec stretch x5' x5'     UBE  3'/3'  4'/4'   Nu step   x10'    TB scapular extensions   ytb 2x20    b tb ER   rtb 2x20 rtb 2x20   multiangle isometrics   x10'    Tb wall walks    x5 ea direction rtb         MANUAL THERAPY: (25 minutes): Joint mobilization, Soft tissue mobilization and Manipulation was utilized and necessary because of the patient's restricted joint motion, painful spasm, loss of articular motion and restricted motion of soft tissue. Instrument assisted soft tissue mobilization to right periscapular region with deep pressure release for right levator. Supine manual cervical traction and sub occipital release. MODALITIES: (10 minutes):      *  Cold Pack Therapy in order to provide analgesia and relieve muscle spasm. to right periscapular region.   (Used abbreviations: MET - muscle energy technique; PNF - proprioceptive neuromuscular facilitation; NMR - neuromuscular re-education; AP - anterior to posterior; PA - posterior to anterior)    Pre-Treatment Assessment: Patient reports that she has improved well with decreased pain during all daily activities. Treatment/Session Assessment: Patient has progressed well and will be discharged at next visit if no increased symptoms. .  · Pain/ Symptoms: Initial:   2 Post Session:  0 ·   Compliance with Program/Exercises: Will assess as treatment progresses. · Recommendations/Intent for next treatment session: \"Next visit will focus on advancements to more challenging activities and reduction in assistance provided\".   Total Treatment Duration:  PT Patient Time In/Time Out  Time In: 1600  Time Out: 1208 [facility], PT

## 2018-10-23 ENCOUNTER — HOSPITAL ENCOUNTER (OUTPATIENT)
Dept: PHYSICAL THERAPY | Age: 53
Discharge: HOME | End: 2018-10-23
Payer: COMMERCIAL

## 2018-10-23 NOTE — PROGRESS NOTES
[place] [employer]  : 1965  Primary: [facility]  Secondary:  [address]  [facility] [address] [facility], [place] [address] [address] [facility]  [phone]   [phone]        OUTPATIENT DAILY NOTE    NAME/AGE/GENDER: [place] is a 48 y.o. female. DATE: 10/23/2018    Patient cancelled for appointment today due to feeling good and ready for discharge. Will plan to follow up on next scheduled visit.     Yenifer Jasso, PT

## 2018-10-25 ENCOUNTER — APPOINTMENT (OUTPATIENT)
Dept: PHYSICAL THERAPY | Age: 53
End: 2018-10-25
Payer: COMMERCIAL

## 2018-10-30 ENCOUNTER — APPOINTMENT (OUTPATIENT)
Dept: PHYSICAL THERAPY | Age: 53
End: 2018-10-30
Payer: COMMERCIAL

## 2018-11-01 ENCOUNTER — APPOINTMENT (OUTPATIENT)
Dept: PHYSICAL THERAPY | Age: 53
End: 2018-11-01

## 2019-02-05 NOTE — PROGRESS NOTES
Daniela [employer]  : 1965 [facility] Center at [address]  [phone]   [phone]       OUTPATIENT PHYSICAL THERAPY: Discharge notes 2019    ICD-10: Treatment Diagnosis: Cervicalgia (M54.2)   Precautions/Allergies:   Adhesive; Iodine; and Nickel   Fall Risk Score: 1 (? 5 = High Risk)  MD Orders: Eval and Treat MEDICAL/REFERRING DIAGNOSIS:  Spondylosis without myelopathy or radiculopathy, cervical region [M47.812]  Spondylolisthesis, site unspecified [M43.10]  Radiculopathy, site unspecified [M54.10]   DATE OF ONSET: six months prior  REFERRING PHYSICIAN: Alee Mendes,*  RETURN PHYSICIAN APPOINTMENT: TBD by patient      INITIAL ASSESSMENT:  Ms. Galo Shi has attended 4 physical therapy session including initial evaluation. Galo Shi presents with S/S of decreased pain, improved ROM, improved strength, improved functional tolerance consistent with S/S of right periscapular strain. She has been provided with and advanced HEP and will be discharged from therapy at this time. PROBLEM LIST (Impacting functional limitations):  1. Decreased Strength  2. Decreased ADL/Functional Activities  3. Decreased Transfer Abilities  4. Increased Pain  5. Decreased Activity Tolerance  6. Decreased Flexibility/Joint Mobility  7. Decreased Knowledge of Precautions  8. Decreased [place] with Home Exercise Program INTERVENTIONS PLANNED:  1. Balance Exercise  2. Bed Mobility  3. Cold  4. Cryotherapy  5. Heat  6. Home Exercise Program (HEP)  7. Manual Therapy  8. Neuromuscular Re-education/Strengthening  9. Range of Motion (ROM)  10. Therapeutic Activites  11. Therapeutic Exercise/Strengthening  12. Transfer Training   TREATMENT PLAN:  Effective Dates: 10/1/2018 TO 2019.   Frequency/Duration: up to 2 times a week for 12 weeks   GOALS: (Goals have been discussed and agreed upon with patient.)  SHORT-TERM FUNCTIONAL GOALS: Time Frame: 4 weeks  1. 62 Daphne Hoang will report <=2/10 pain to cervical spine with performance of functional spinal mobility and rotation. Met  2. 62 Daphne Hoang will demonstrate improved NDI score, indicating spinal improvement from 10/50 to 5/50 affecting minimal to no difficulty with performance of cervical mobility and strengthening. met   3. 62 Daphne Hoang to be independent with initial HEP for cervical region and UE's. Met  4. 62 Daphne Hoang will improve ROM to >=90% to assist with improved function during instrumental activities of daily living. met  5. 62 Daphne Hoang will improve MMT to >=4+/5 to all UE strengthening to return to PLOF and improve functional tolerance. met    DISCHARGE GOALS: Time Frame: 8 weeks  1. 62 Daphne Hoang will report <=0/10 pain to cervical spine with performance of functional spinal mobility and rotation and minimal to no difficulty with such tasks. met  2. 62 Daphne Hoang will demonstrate improved NDI score, indicating spinal improvement from 5/50 to 0/50 affecting minimal to no difficulty with performance of cervical mobility and strengthening. met   3. 62 Daphne Hoang to be independent with advanced HEP for cervical region and UE's. met    Rehabilitation Potential For Stated Goals: Good  Regarding Solangelove Butleryadira Porfirio's therapy, I certify that the treatment plan above will be carried out by a therapist or under their direction. Thank you for this referral,  Marcela Hendrix, PT     Referring Physician Signature: Joann Shane,*              Date                    HISTORY:   History of Present Injury/Illness (Reason for Referral):  Ms. Opal Berger reports that she began having right sided neck and scapular pain with sleep, driving, pilates, and cycling. She reports that her symptoms are increased with overhead activity and turning her neck and decreased with pain medication.   She reports sleep disturbance of one to two hours nightly. She has tried chiropractic and massage with short term improvement in pain. Her goals are to be able to safely return to regular exercise and to be safe with activity. Past Medical History/Comorbidities:   Ms. Geovanna Hartmann  has a past medical history of Agatston coronary artery calcium score less than 100 (11/16/2011), FH: thyroid disease (4/12/2013), History of melanoma (4/12/2013), HTN (hypertension) (9/12/2012), Hyperlipidemia (10/9/2015), Hypertension, Overweight(278.02) (4/12/2013), Spinal stenosis of lumbar region (4/12/2013), and Trigeminal neuralgia (4/12/2013). Ms. Geovanna Hartmann  has a past surgical history that includes hx orthopaedic; pr breast surgery procedure unlisted; hx tonsillectomy; hx breast biopsy; hx other surgical (Feb 2014); and hx other surgical (2014). Social History/Living Environment:     lives in private residence with spouse  Prior Level of Function/Work/Activity:  Independent with exercise and community activities prior to recent symptom exacerbation. Current Medications:    Current Outpatient Medications:     ospemifene (OSPHENA) 60 mg tab tablet, Take 60 mg by mouth daily (with breakfast). , Disp: , Rfl:     ALPRAZolam (XANAX) 0.5 mg tablet, Take 1 Tab by mouth nightly as needed. Max Daily Amount: 0.5 mg., Disp: 30 Tab, Rfl: 5    FLUoxetine (PROZAC) 10 mg capsule, Take 1 Cap by mouth daily. , Disp: 30 Cap, Rfl: 2    hydroCHLOROthiazide (HYDRODIURIL) 25 mg tablet, TAKE ONE TABLET BY MOUTH ONE TIME DAILY, Disp: 90 Tab, Rfl: 3    potassium chloride SR (KLOR-CON 10) 10 mEq tablet, TAKE ONE TABLET BY MOUTH ONE TIME DAILY, Disp: 90 Tab, Rfl: 3    potassium chloride (KLOR-CON) 10 mEq tablet, Take 1 Tab by mouth daily. , Disp: 90 Tab, Rfl: 3    naproxen (NAPROSYN) 500 mg tablet, Take 1 Tab by mouth two (2) times daily (with meals). , Disp: 60 Tab, Rfl: 1    losartan (COZAAR) 50 mg tablet, Take 1 Tab by mouth daily. , Disp: 90 Tab, Rfl: 3    propranolol (INDERAL) 10 mg tablet, Take 1 Tab by mouth daily as needed. , Disp: 30 Tab, Rfl: 3    valACYclovir (VALTREX) 1 gram tablet, TAKE ONE TABLET BY MOUTH TWICE A DAY AS NEEDED, Disp: 60 Tab, Rfl: 1    OTHER, Indications: airborne origional, Disp: , Rfl:     docusate sodium (STOOL SOFTENER) 100 mg capsule, Take 100 mg by mouth two (2) times a day., Disp: , Rfl:     ciclopirox (PENLAC) 8 % solution, Apply to nails daily after bathing, Disp: 6.6 mL, Rfl: 5    hydrocortisone valerate (WESTCORT) 0.2 % topical cream, APPLY TOPICALLY TO RASH ON FACE TWO TIMES A DAY UNTIL CLEAR THEN AS NEEDED FOR FLARES, Disp: , Rfl: 3    cholecalciferol (VITAMIN D3) 1,000 unit cap, Take 1 Cap by mouth daily. , Disp: , Rfl:     fish oil-omega-3 fatty acids 340-1,000 mg capsule, Take  by mouth daily. , Disp: , Rfl:     norethindrone-e.estradiol-iron (LO LOESTRIN FE) 1 mg-10 mcg (24)/10 mcg (2) tab, Take one tablet by mouth daily, Disp: 3 Package, Rfl: 3     Date Last Reviewed:  2/5/2019   Number of Personal Factors/Comorbidities that affect the Plan of Care: 3+: HIGH COMPLEXITY   EXAMINATION:   Observation/Orthostatic Postural Assessment: Forward head posture with bilateral rounded shoulders and left periscapular winging. Limited pec minor mobility, limited upper trap mobility, limited levator scapulae mobility that is more significant on the right t  Palpation:          TTP over the right pec minor insertion, right upper trap, and right mid-horacic paraspinals, along medial scapular border. Limited PA glides in mid thoracic, upper thoracic, and cervical right 3-4, 4-5, 5-6  ROM:    Joint: Eval Date: 10/1/2018 Re-Assess Date: 10/18/2018 Re-Assess Date:         Cervical AROM      Flexion (% of normal): 50 75    Extension (% of normal): 50 75    L sidebending (% of normal): 75 75    R sidebending (% of normal): 50 75    L rotation (% of normal): 75 75    R rotation (% of normal): 50 75                Pain at end-range or with AROM?  Yes/No On right     Any pain with overpressure? Yes/No Yes on right       Strength:    Joint: Eval Date: 10/1/2018 Re-Assess Date: 10/18/2018 Re-Assess Date:     RIGHT LEFT RIGHT LEFT RIGHT LEFT   Shoulder flexion:  4/5 5/5 5 5     Shoulder extension: 5/5 5/5 5 5     Shoulder abduction: 4/5 5/5 5 5     Shoulder IR: 5/5 5/5 5 5     Shoulder ER: 4/5 5/5 5 5     Scapular retraction 4/5 5/5 5 5     Horizontal abduction 4/5 5/5 5 5     Scapular elevation 4/5 5/5 5 5         Special Tests: Assessed @ Initial Visit    Spurling's Test: Positive on right. Distraction test: positve              neers impingement test     Neurological Screen: Radiating symptoms? Yes, into right shoulder blade   Reflexes and myotomes equal for bilateral UE. Functional Mobility:  Assessed @ Initial Visit. Pain with supine to sit in right scapula        Body Structures Involved:  1. Nerves  2. Joints  3. Muscles  4. Ligaments Body Functions Affected:  1. Sensory/Pain  2. Neuromusculoskeletal  3. Movement Related Activities and Participation Affected:  1. General Tasks and Demands  2. Mobility  3. Self Care  4. Domestic Life  5. Interpersonal Interactions and Relationships  6. Community, Social and [place]   Number of elements that affect the Plan of Care: 3: MODERATE COMPLEXITY   CLINICAL PRESENTATION:   Presentation: Stable and uncomplicated: LOW COMPLEXITY   CLINICAL DECISION MAKING:   Outcome Measure: Tool Used: Neck Disability Index (NDI)  Score:  Initial: 10/50  Most Recent: X/50 (Date: -- )   Interpretation of Score: The Neck Disability Index is a revised form of the Oswestry Low Back Pain Index and is designed to measure the activities of daily living in person's with neck pain. Each section is scored on a 0-5 scale, 5 representing the greatest disability. The scores of each section are added together for a total score of 50.    Score 0 1-10 11-20 21-30 31-40 41-49 50   Modifier CH CI CJ CK CL CM CN         Medical Necessity:   · Skilled intervention continues to be required due to address above deficits affecting participation in basic ADLs and overall functional tolerance. Reason for Services/Other Comments:  · Patient continues to require skilled intervention due to address above deficits affecting participation in basic ADLs and overall functional tolerance.    Use of outcome tool(s) and clinical judgement create a POC that gives a: Clear prediction of patient's progress: LOW COMPLEXITY                 Thank you for this referral!    Liss Power, PT

## 2019-06-20 ENCOUNTER — HOSPITAL ENCOUNTER (OUTPATIENT)
Dept: PHYSICAL THERAPY | Age: 54
Discharge: HOME | End: 2019-06-20
Attending: INTERNAL MEDICINE
Payer: COMMERCIAL

## 2019-06-20 DIAGNOSIS — M54.12 CERVICAL RADICULOPATHY: ICD-10-CM

## 2019-06-20 PROCEDURE — 97110 THERAPEUTIC EXERCISES: CPT

## 2019-06-20 PROCEDURE — 97161 PT EVAL LOW COMPLEX 20 MIN: CPT

## 2019-06-20 NOTE — THERAPY EVALUATION
Dior Collins  : 1965  Primary: [facility]  Secondary:  [address] at [facility]  [address], [address], [place], [facility]  [phone]   [phone]          OUTPATIENT PHYSICAL THERAPY:Initial Assessment 2019   ICD-10: Treatment Diagnosis: Pain in left shoulder (M25.512)  Shoulder lesion, unspecified, left shoulder (M75.92)  Stiffness of left shoulder, not elsewhere classified (M25.612)    Precautions/Allergies:   Adhesive; Chlorhexidine; Iodine; and Nickel   TREATMENT PLAN:  Effective Dates: 2019 TO 2019 (90 days). Frequency/Duration: 2 times a week for 90 Day(s) MEDICAL/REFERRING DIAGNOSIS:  Cervical radiculopathy [M54.12]   DATE OF ONSET: 19  REFERRING PHYSICIAN: Adele Booker MD MD Orders: PT referal  Return MD Appointment: unknown     INITIAL ASSESSMENT:  Ms. Gama Collins presents  with bilateral shoulder pain including numbness and tingling down arms into hands and all fingers. Her cervical ROM is restricted in C2-7 especially with SBR and rot R. Contributing factors to this is tight paracervicals, upper trap and R SCM. These restrictions are preventing pt from comfort as she is now returning back to work  Working for [employer], reviewing policies in [employer] sitting, standing and interfering with comfort for ADLs such as showering, reaching up. She is a candidate for skilled PT is indicated to improve present condition and more comfortable ADLs . PROBLEM LIST (Impacting functional limitations):  1. Decreased Strength  2. Decreased ADL/Functional Activities  3. Increased Pain  4. Decreased Activity Tolerance  5. Decreased Flexibility/Joint Mobility INTERVENTIONS PLANNED: (Treatment may consist of any combination of the following)  1. Home Exercise Program (HEP)  2. Manual Therapy  3. Neuromuscular Re-education/Strengthening  4. Range of Motion (ROM)  5. Therapeutic Activites  6.  Therapeutic Exercise/Strengthening     GOALS: (Goals have been discussed and agreed upon with patient.)  Time Frame: 30 days   1. Decrease pain below 3/10 in  bilat shoulder, cervical with beginning home ADL's / shoulder/cervical mobility. 2. Decrease NDI from 19 to 10 in the right shoulder to indicate functional improvement and to demonstrate improved range of motion and functional improvement of the shoulder. 3. Patient to be independent with an initial HEP for the shoulder and cervical for mobility and strengthening. DISCHARGE GOALS:   90 days    1. Pt will reduce score on NDI to 5/50 in order to demonstrate improved functional mobility and quality of life. 2. Pt will report working for > 8hrs with minimal to no increase in pain in order to be able to stand for prolonged periods as needed to perform work activities. 3. Patient to demonstrate increased cervcial ROM to 100%. OUTCOME MEASURE:   Tool Used: Neck Disability Index (NDI)  Score:  Initial: 19/50  Most Recent: X/50 (Date: -- )   Interpretation of Score: The Neck Disability Index is a revised form of the Oswestry Low Back Pain Index and is designed to measure the activities of daily living in person's with neck pain. Each section is scored on a 0-5 scale, 5 representing the greatest disability. The scores of each section are added together for a total score of 50. MEDICAL NECESSITY:   · Skilled intervention continues to be required due to decreased function. REASON FOR SERVICES/OTHER COMMENTS:  · Patient continues to require skilled intervention due to medical complications and patient unable to attend/participate in therapy as expected. Total Duration:  PT Patient Time In/Time Out  Time In: 1335  Time Out: 1415    Rehabilitation Potential For Stated Goals: Excellent  Regarding Jamey Wright Porfirio's therapy, I certify that the treatment plan above will be carried out by a therapist or under their direction.   Thank you for this referral,  Cachorro See DPT     Referring Physician Signature: Mikhail Currie MD _______________________________ Date _____________     PAIN/SUBJECTIVE:   Initial:   4/10 Post Session:  3/10   HISTORY:   History of Injury/Illness (Reason for Referral):  Pt states 1 week ago she woke up with numbness/tingling in bilat arms, hands, fingers but more R/L. She has previous episodes she can remember including PT sessions. This session has never had N/T. She is recovering from recent laminectomy surgery and just returned to work last week. She has more pain when waking up. Past Medical History/Comorbidities:   Ms. Gama Collins  has a past medical history of Agatston coronary artery calcium score less than 100 (11/16/2011), FH: thyroid disease (4/12/2013), History of melanoma (4/12/2013), HTN (hypertension) (9/12/2012), Hyperlipidemia (10/9/2015), Hypertension, Overweight(278.02) (4/12/2013), Spinal stenosis of lumbar region (4/12/2013), and Trigeminal neuralgia (4/12/2013). Ms. [employer]  has a past surgical history that includes hx orthopaedic; pr breast surgery procedure unlisted; hx tonsillectomy; hx breast biopsy; hx other surgical (Feb 2014); and hx other surgical (2014). Social History/Living Environment:     she lives alone, IND, drives, works. Prior Level of Function/Work/Activity:  Pt prior to surgery, completely IND, however past month she has been sedentary, minimal activity. Yesterday she broke 3 toes in L foot when stepping on a malfunctioning water meter plate. Ambulatory/Rehab Services H2 Model Falls Risk Assessment   Risk Factors:  Click here to CLEAR selections Ability to Rise from Chair:       (1)  Pushes up, successful in one attempt   Falls Prevention Plan:       Physical Limitations to Exercise (specify):  limited by foot injuries       Mobility Assistance Device (specify):  camBoot on L   Total: (5 or greater = High Risk): 1   ©2010 [employer].  [place] States Patent #[id number]. Federal Law prohibits the replication, distribution or use without written permission from [facility]   Current Medications:       Current Outpatient Medications:     zolpidem (AMBIEN) 5 mg tablet, Take 1/2 to 1 tablet by mouth daily at bedtime as needed for sleep, Disp: 30 Tab, Rfl: 1    aspirin 81 mg chewable tablet, Take 81 mg by mouth daily. , Disp: , Rfl:     OTHER, Olapatadine eye drop, Disp: , Rfl:     FLUoxetine (PROZAC) 20 mg capsule, Take 1 Cap by mouth daily. , Disp: 90 Cap, Rfl: 3    efinaconazole (JUBLIA EX), by Apply Externally route., Disp: , Rfl:     clotrimazole-betamethasone (LOTRISONE) topical cream, Apply  to affected area two (2) times a day., Disp: 15 g, Rfl: 0    ospemifene (OSPHENA) 60 mg tab tablet, Take 60 mg by mouth daily (with breakfast). , Disp: , Rfl:     ALPRAZolam (XANAX) 0.5 mg tablet, Take 1 Tab by mouth nightly as needed. Max Daily Amount: 0.5 mg., Disp: 30 Tab, Rfl: 5    hydroCHLOROthiazide (HYDRODIURIL) 25 mg tablet, TAKE ONE TABLET BY MOUTH ONE TIME DAILY, Disp: 90 Tab, Rfl: 3    potassium chloride SR (KLOR-CON 10) 10 mEq tablet, TAKE ONE TABLET BY MOUTH ONE TIME DAILY, Disp: 90 Tab, Rfl: 3    potassium chloride (KLOR-CON) 10 mEq tablet, Take 1 Tab by mouth daily. , Disp: 90 Tab, Rfl: 3    naproxen (NAPROSYN) 500 mg tablet, Take 1 Tab by mouth two (2) times daily (with meals). , Disp: 60 Tab, Rfl: 1    losartan (COZAAR) 50 mg tablet, Take 1 Tab by mouth daily. , Disp: 90 Tab, Rfl: 3    propranolol (INDERAL) 10 mg tablet, Take 1 Tab by mouth daily as needed. , Disp: 30 Tab, Rfl: 3    valACYclovir (VALTREX) 1 gram tablet, TAKE ONE TABLET BY MOUTH TWICE A DAY AS NEEDED, Disp: 60 Tab, Rfl: 1    OTHER, Indications: airborne origional, Disp: , Rfl:     docusate sodium (STOOL SOFTENER) 100 mg capsule, Take 100 mg by mouth two (2) times a day., Disp: , Rfl:     hydrocortisone valerate (WESTCORT) 0.2 % topical cream, APPLY TOPICALLY TO RASH ON FACE TWO TIMES A DAY UNTIL CLEAR THEN AS NEEDED FOR FLARES, Disp: , Rfl: 3    cholecalciferol (VITAMIN D3) 1,000 unit cap, Take 1 Cap by mouth daily. , Disp: , Rfl:     fish oil-omega-3 fatty acids 340-1,000 mg capsule, Take  by mouth daily. , Disp: , Rfl:    Date Last Reviewed:  6/20/2019     Number of Personal Factors/Comorbidities that affect the Plan of Care: 0: LOW COMPLEXITY   EXAMINATION:   Palpation:          Tender/tight R SCM, paracervicals, upper traps. 2/6 jnt restrictions in C2-7 with SBR and rot R  ROM:          50% motion with cervical SBR and rot R, WNL to L. Body Structures Involved:  1. Nerves  2. Bones  3. Joints  4. Muscles  5. Ligaments Body Functions Affected:  1. Sensory/Pain  2. Neuromusculoskeletal  3. Movement Related Activities and Participation Affected:  1.  General Tasks and Demands   Number of elements (examined above) that affect the Plan of Care: 1-2: LOW COMPLEXITY   CLINICAL PRESENTATION:   Presentation: Stable and uncomplicated: LOW COMPLEXITY   CLINICAL DECISION MAKING:   Use of outcome tool(s) and clinical judgement create a POC that gives a: Clear prediction of patient's progress: LOW COMPLEXITY

## 2019-06-20 NOTE — PROGRESS NOTES
Stephy Ast [employer]  : 1965  Primary: Rakan Shepherd [facility]  Secondary:  [address]  at [facility]  [address], [address], [place] [address] [address] [facility]  [phone]   [phone]        OUTPATIENT PHYSICAL THERAPY: Daily Treatment Note 2019  Visit Count:  1        Pre-treatment Symptoms/Complaints:  See eval  Pain: Initial:   4/10 Post Session:  4/10   Medications Last Reviewed:  2019  Updated Objective Findings:  See evaluation note from today  TREATMENT:     THERAPEUTIC EXERCISE: (10 minutes):  Exercises per grid below to improve mobility, strength, balance and coordination. Required maximal verbal and manual cues to promote proper body alignment, promote proper body posture and promote proper body mechanics. Progressed range and complexity of movement as indicated. Date:  19 Date:   Date:     Activity/Exercise Parameters Parameters Parameters   Neck stretch [id number]     pect stretch [id number]                                       [employer] Portal  Treatment/Session Summary:    · Response to Treatment:  see eval.  · Communication/Consultation:  None today  · Equipment provided today:  None today  · Recommendations/Intent for next treatment session: Next visit will focus on ROM, strengthening.     Total Treatment Billable Duration:  10 minutes  PT Patient Time In/Time Out  Time In: 4661  Time Out: 201 Dolores Harry DPT    Future Appointments   Date Time Provider Stacey Flores   2019  4:00 PM Patrica Bennett [facility]   2019  2:30 PM Doreen Aggarwal DPT [facility] D   2019  4:00 PM Patrica Bennett [facility] S[facility]   2019  3:15 PM Patrica Bennett [facility] S[facility]   2019  3:15 PM Patrica Bennett [facility]   2019  3:15 PM Doreen Aggarwal DPT [facility] SFD   2019  8:50 AM 10 [facility] LAB SSA 10 [facility] 10 [facility]   2019  8:15 AM Nuria Dixon MD SSA [address] 10 [facility]

## 2019-06-24 ENCOUNTER — HOSPITAL ENCOUNTER (OUTPATIENT)
Dept: PHYSICAL THERAPY | Age: 54
Discharge: HOME | End: 2019-06-24
Attending: INTERNAL MEDICINE
Payer: COMMERCIAL

## 2019-06-24 PROCEDURE — 97110 THERAPEUTIC EXERCISES: CPT

## 2019-06-24 PROCEDURE — 97140 MANUAL THERAPY 1/> REGIONS: CPT

## 2019-06-24 NOTE — PROGRESS NOTES
Perry Reason [employer]  : 1965  Primary: [place] [facility]  Secondary:  [address] at [facility]  [address], [address] [place] [address], [address]  [phone]   [phone]        OUTPATIENT PHYSICAL THERAPY: Daily Treatment Note 2019  Visit Count:  2        Pre-treatment Symptoms/Complaints:   Patient reports her left side and back have been hurting more due to the boot on L foot. Pain: Initial:   4/10 Post Session:  2/10   Medications Last Reviewed:  2019  Updated Objective Findings:  See evaluation note from today  TREATMENT:     THERAPEUTIC EXERCISE: (8 minutes):  Exercises per grid below to improve mobility, strength, balance and coordination. Required maximal verbal and manual cues to promote proper body alignment, promote proper body posture and promote proper body mechanics. Progressed range and complexity of movement as indicated. Date:  19 Date:   Date:     Activity/Exercise Parameters Parameters Parameters   Neck stretch [id number]     pect stretch [id number]                                     MANUAL THERAPY: (32 minutes): Soft tissue mobilization and myofascial work  was utilized and necessary because of the patient's restricted joint motion, painful spasm, restricted motion of soft tissue and trigger points. Patient supine for occipital pulls with soft tissue work. Patient then prone for soft tissue work to B upper traps, scalenes, and levator. MODALITIES: (10 minutes):       Cold Pack Therapy in order to provide analgesia and reduce inflammation and edema to B upper trap with patient prone with pillows under ankles. Skin was clear and intact. [employer] Portal  Treatment/Session Summary:  Patient with increased mobility and decrease pain upon departure. · Response to Treatment:  Patient with good release in upper trap and cervical region.  .  · Communication/Consultation:  None today  · Equipment provided today:  None today  · Recommendations/Intent for next treatment session: Next visit will focus on ROM, strengthening.     Total Treatment Billable Duration:  40 minutes  PT Patient Time In/Time Out  Time In: 1600  Time Out: 89 Loraine Viktor, LUDIVINA    Future Appointments   Date Time Provider Stacey Flores   6/27/2019  2:30 PM Colin Newman DPT [facility] SFD   7/1/2019  4:00 PM Cinderella Pall, [facility]   7/5/2019  3:15 PM Cinderella Pall, [facility]   7/8/2019  3:15 PM Cinderella Pall, [facility] SFD   7/11/2019  3:15 PM Tacey Och, [facility]   8/14/2019  8:50 AM [facility] LAB [facility]   8/21/2019  8:15 AM Jesús Espinal MD [facility]

## 2019-06-27 ENCOUNTER — APPOINTMENT (OUTPATIENT)
Dept: PHYSICAL THERAPY | Age: 54
End: 2019-06-27
Attending: INTERNAL MEDICINE
Payer: COMMERCIAL

## 2019-06-28 ENCOUNTER — HOSPITAL ENCOUNTER (OUTPATIENT)
Dept: PHYSICAL THERAPY | Age: 54
Discharge: HOME | End: 2019-06-28
Attending: INTERNAL MEDICINE
Payer: COMMERCIAL

## 2019-06-28 PROCEDURE — 97110 THERAPEUTIC EXERCISES: CPT

## 2019-06-28 PROCEDURE — 97140 MANUAL THERAPY 1/> REGIONS: CPT

## 2019-06-28 NOTE — PROGRESS NOTES
Addy Greene [employer]  : 1965  Primary: [facility]  Secondary:  [address]  at [facility]  [address], [address], [place], [facility] [address] [facility]  [phone]   [phone]        OUTPATIENT PHYSICAL THERAPY: Daily Treatment Note 2019  Visit Count:  3        Pre-treatment Symptoms/Complaints:   Patient reports she started on a steroid this week and is feeling better. Her biggest complaint is numbness and tingling. Pain: Initial:   2-3/10 Post Session:  2/10   Medications Last Reviewed:  2019  Updated Objective Findings:  None Today  TREATMENT:     THERAPEUTIC EXERCISE: ( 10 minutes):  Exercises per grid below to improve mobility, strength, balance and coordination. Required maximal verbal and manual cues to promote proper body alignment, promote proper body posture and promote proper body mechanics. Progressed range and complexity of movement as indicated. Date:  19 Date:  19 Date:     Activity/Exercise Parameters Parameters Parameters   Neck stretch [id number]     pect stretch [id number]     Ulnar nerve glide  X 10 on R side                                MANUAL THERAPY: (45 minutes): Soft tissue mobilization and myofascial work  was utilized and necessary because of the patient's restricted joint motion, painful spasm, restricted motion of soft tissue and trigger points. Patient supine for occipital pulls, median nerve glides to R with decompression of R shoulder, and soft tissue work to B cervical region. Patient supine with wedge under legs. MODALITIES: (10 minutes):       Cold Pack Therapy in order to provide analgesia and reduce inflammation and edema to B upper traps with patient supine with wedge under legs. Skin was clear and intact. [employer] Portal  Treatment/Session Summary:  Patient with decreased numbness and tingling upon departure. She was very relaxed upon departure.    · Response to Treatment:  Patient with good relief from numbness and tingling. .  · Communication/Consultation:  None today  · Equipment provided today:  None today  · Recommendations/Intent for next treatment session: Next visit will focus on ROM, strengthening.     Total Treatment Billable Duration:  55 minutes  PT Patient Time In/Time Out  Time In: 1600  Time Out: 65 [facility], [facility]    Future Appointments   Date Time Provider Stacey Flores   7/1/2019  4:00 PM Jaynee Dakins, [facility]   7/5/2019  3:15 PM Jaynee Dakins, [facility]   7/8/2019  3:15 PM Jaynee Dakins, [facility]   7/11/2019  3:15 PM Myles Bush DPT [facility]   8/14/2019  8:50 AM 10 [facility] LAB [facility] 10 [address]   8/21/2019  8:15 AM Marycruz Abarca MD [facility] 10 [address]

## 2019-07-01 ENCOUNTER — HOSPITAL ENCOUNTER (OUTPATIENT)
Dept: PHYSICAL THERAPY | Age: 54
Discharge: HOME | End: 2019-07-01
Attending: INTERNAL MEDICINE
Payer: COMMERCIAL

## 2019-07-01 PROCEDURE — 97140 MANUAL THERAPY 1/> REGIONS: CPT

## 2019-07-01 PROCEDURE — 97110 THERAPEUTIC EXERCISES: CPT

## 2019-07-01 NOTE — PROGRESS NOTES
Annmarie [facility] [employer]  : 1965  Primary: Andrew Mcd[facility]  Secondary:  [address]  at [facility]  [address], [address], [place], [facility]  [phone]   [phone]        OUTPATIENT PHYSICAL THERAPY: Daily Treatment Note 2019  Visit Count:  4        Pre-treatment Symptoms/Complaints:   Patient reports she has had heavy tingling and numbness started Saturday afternoon. Pain: Initial:   6/10- uncomfortableness - heavy tingling and numbness Post Session: -3/10   Medications Last Reviewed:  2019  Updated Objective Findings:  None Today  TREATMENT:     THERAPEUTIC EXERCISE: ( 8 minutes):  Exercises per grid below to improve mobility, strength, balance and coordination. Required maximal verbal and manual cues to promote proper body alignment, promote proper body posture and promote proper body mechanics. Progressed range and complexity of movement as indicated. Date:  19 Date:  19 Date:  19   Activity/Exercise Parameters Parameters Parameters   Neck stretch [id number]  3 x 30 sec against wall   pect stretch [id number]     Ulnar nerve glide  X 10 on R side      UBE    Level 1  3/3                        MANUAL THERAPY: (32 minutes): Soft tissue mobilization and myofascial work  was utilized and necessary because of the patient's restricted joint motion, painful spasm, restricted motion of soft tissue and trigger points. Patient prone for work to B upper traps, B scapular regions, and cervical paraspinals with hands and tool assisted by \"boom stick\" for deeper work. Patient prone with pillows under ankles. Juan Rodriguez MODALITIES: (10 minutes):       Cold Pack Therapy in order to provide analgesia and reduce inflammation and edema to B upper traps with patient prone with pillows under ankles. Skin was clear and intact. [employer] Portal  Treatment/Session Summary:  Patient with decreased heaviness with numbness and tingling upon departure.  She had lots of tightness in R levator and R upper trap region. No numbness or tingling noted during any manual work. · Response to Treatment:  Patient with good relief from heavy numbness and tingling. Patient to continue with stretches at home. · Communication/Consultation:  None today  · Equipment provided today:  None today  · Recommendations/Intent for next treatment session: Next visit will focus on ROM, strengthening.     Total Treatment Billable Duration:  40 minutes  PT Patient Time In/Time Out  Time In: 8854  Time Out: C/ Mak Brizuela, PTA    Future Appointments   Date Time Provider Stacey Flores   7/5/2019  3:15 PM [facility]   7/8/2019  3:15 PM Ish Sensor, [facility] SFD   7/11/2019  3:15 PM Julianne Mcconnell DPT [facility]   8/14/2019  8:50 AM [facility] LAB [facility]   8/21/2019  8:15 AM Marissa Tony MD [facility]

## 2019-07-05 ENCOUNTER — HOSPITAL ENCOUNTER (OUTPATIENT)
Dept: PHYSICAL THERAPY | Age: 54
Discharge: HOME | End: 2019-07-05
Attending: INTERNAL MEDICINE
Payer: COMMERCIAL

## 2019-07-05 PROCEDURE — 97110 THERAPEUTIC EXERCISES: CPT

## 2019-07-05 PROCEDURE — 97140 MANUAL THERAPY 1/> REGIONS: CPT

## 2019-07-05 NOTE — PROGRESS NOTES
Chanel Casper [employer]  : 1965  Primary: Charlotte [facility]  Secondary:  [address]  at [address] [facility] [address], [address], [place], [address] [facility]  [phone]   [phone]        OUTPATIENT PHYSICAL THERAPY: Daily Treatment Note 2019  Visit Count:  5        Pre-treatment Symptoms/Complaints:   Patient reports the heaviness is better, but still having tingling down into her hand with numbness. Pain: Initial:   4/10- uncomfortableness - heavy tingling and numbness Post Session: 2/10   Medications Last Reviewed:  2019  Updated Objective Findings:  None Today  TREATMENT:     THERAPEUTIC EXERCISE: ( 10 minutes):  Exercises per grid below to improve mobility, strength, balance and coordination. Required maximal verbal and manual cues to promote proper body alignment, promote proper body posture and promote proper body mechanics. Progressed range and complexity of movement as indicated. Date:  19 Date:  19 Date:  19 Date:  19   Activity/Exercise Parameters Parameters Parameters    Neck stretch [id number]  3 x 30 sec against wall    pect stretch [id number]      Ulnar nerve glide  X 10 on R side   2 min     UBE    Level 1  3/3  Level 1  /                          MANUAL THERAPY: (35 minutes): Soft tissue mobilization and myofascial work  was utilized and necessary because of the patient's restricted joint motion, painful spasm, restricted motion of soft tissue. Patient supine with wedge for work to entire R arm along ulnar and median nerves to decrease tightness and increase mobility. Multiple nerve glides with soft tissue mobilization on nerve to assist with mobility. MODALITIES: (10 minutes):       Cold Pack Therapy in order to provide analgesia and reduce inflammation and edema to B upper traps with patient supine with wedge under legs. Skin was clear and intact.         [employer] Portal  Treatment/Session Summary:  Patient with only slight symptoms in R thumb upon departure. creased heaviness with numbness and tingling upon departure. She had lots of tightness in R levator and R upper trap region. No numbness or tingling noted during any manual work. · Response to Treatment:  Patient with good relief from heavy numbness and tingling. Patient to continue with stretches at home. · Communication/Consultation:  None today  · Equipment provided today:  None today  · Recommendations/Intent for next treatment session: Next visit will focus on ROM, strengthening.     Total Treatment Billable Duration: 45 minutes   PT Patient Time In/Time Out  Time In: 1345  Time Out: [address], PTA    Future Appointments   Date Time Provider Stacey Flores   7/8/2019  3:15 PM Yovany Fox PTA [facility] Kenji Ronquillo   7/11/2019  3:15 PM Randy Farr DPT [facility] SORAIDA   8/14/2019  8:50 AM 10 [facility] LAB [address]   8/21/2019  8:15 AM Kirstin Jaquez MD [address]

## 2019-07-08 ENCOUNTER — HOSPITAL ENCOUNTER (OUTPATIENT)
Dept: PHYSICAL THERAPY | Age: 54
Discharge: HOME | End: 2019-07-08
Attending: INTERNAL MEDICINE
Payer: COMMERCIAL

## 2019-07-08 PROCEDURE — 97140 MANUAL THERAPY 1/> REGIONS: CPT

## 2019-07-08 PROCEDURE — 97110 THERAPEUTIC EXERCISES: CPT

## 2019-07-08 NOTE — PROGRESS NOTES
Cali Norris [employer]  : 1965  Primary: Zeferino Whiting [facility]  Secondary:  [address] at [facility]  [address], [address], [place], [address] [facility]  [phone]   [phone]        OUTPATIENT PHYSICAL THERAPY: Daily Treatment Note 2019  Visit Count:  6        Pre-treatment Symptoms/Complaints:   Patient reports after last visit she was almost symptom free for between 24 and 48 hours. When it started back it was from sholder/neck region all the way down. Pain: Initial:   3/10- uncomfortableness  Post Session: 2/10   Medications Last Reviewed:  2019  Updated Objective Findings:  None Today  TREATMENT:     THERAPEUTIC EXERCISE: ( 8 minutes):  Exercises per grid below to improve mobility, strength, balance and coordination. Required maximal verbal and manual cues to promote proper body alignment, promote proper body posture and promote proper body mechanics. Progressed range and complexity of movement as indicated. Date:  19 Date:  19 Date:  19 Date:  19 Date:  19   Activity/Exercise Parameters Parameters Parameters     Neck stretch [id number]  3 x 30 sec against wall     pect stretch [id number]       Ulnar nerve glide  X 10 on R side   Ulnar nerve glides      UBE    Level 1  3/3  Level 1  4/4 Level 2  4/4                              MANUAL THERAPY: (47 minutes): Soft tissue mobilization and myofascial work  was utilized and necessary because of the patient's restricted joint motion, painful spasm, restricted motion of soft tissue. Patient prone with pillow under ankles for work to B upper trap, B scapular borders and R levator to decrease tightness and increase mobility. Very notable tightness on R vs L. Boom stick used for trigger point work to [facility] region. Patient with minimal numbness in thumb, but none in arm.        MODALITIES: (10 minutes):       Cold Pack Therapy in order to provide analgesia and reduce inflammation and edema to B upper traps with patient supine with wedge under legs. Skin was clear and intact. [employer] Portal  Treatment/Session Summary:  Patient with minimal numbness in R thumb upon departure. She reports today was the last day of her oral steroid. She also informed me that her family doctor is sending her for an MRI. She has relief with therapy, but is only temporary. She reported feeling exhausted at end of session today. · Response to Treatment:  Patient with good relief from heavy numbness and tingling. Patient to continue with stretches at home. · Communication/Consultation:  None today  · Equipment provided today:  None today  · Recommendations/Intent for next treatment session: Next visit will focus on ROM, strengthening, and mobility.      Total Treatment Billable Duration: 55 minutes   PT Patient Time In/Time Out  Time In: 1510  Time Out: [address]    Future Appointments   Date Time Provider Stacey Flores   7/11/2019  3:15 PM Randy Farr DPT [facility] SFTANA   7/17/2019  9:30 PM [facility]   8/14/2019  8:50 AM 10 [facility] LAB SSA [address]   8/21/2019  8:15 AM Kirstin Jaquez MD [facility] 10 [address]

## 2019-07-11 ENCOUNTER — HOSPITAL ENCOUNTER (OUTPATIENT)
Dept: PHYSICAL THERAPY | Age: 54
Discharge: HOME | End: 2019-07-11
Attending: INTERNAL MEDICINE
Payer: COMMERCIAL

## 2019-07-11 PROCEDURE — 97110 THERAPEUTIC EXERCISES: CPT

## 2019-07-11 PROCEDURE — 97140 MANUAL THERAPY 1/> REGIONS: CPT

## 2019-07-11 NOTE — PROGRESS NOTES
Oral Plump [employer]  : 1965  Primary: Imelda Neal [facility]  Secondary:  [address]  at [facility]  [address], [address], [place], [facility] [address] [facility]  [phone]   [phone]        OUTPATIENT PHYSICAL THERAPY: Daily Treatment Note 2019  Visit Count:  7        Pre-treatment Symptoms/Complaints:   Patient reports she had MD appt yesterday. She stays in boot for 3 more weeks. Her apprehension of work is getting in/out of car, carrying items, lots of walking, elevating shld from stress. Pain: Initial:   3/10- uncomfortableness  Post Session: 2/10   Medications Last Reviewed:  2019  Updated Objective Findings:  Pt gait pattern normal given boot. Improved shld positioning in more of posterior/inferior resting position. TREATMENT:     THERAPEUTIC EXERCISE: ( 24 minutes):  Exercises per grid below to improve mobility, strength, balance and coordination. Required maximal verbal and manual cues to promote proper body alignment, promote proper body posture and promote proper body mechanics. Progressed range and complexity of movement as indicated. Date:  19 Date:  19 Date:  19 Date:  19 Date:  19 Date:  19   Activity/Exercise Parameters Parameters Parameters      Neck stretch [id number]  3 x 30 sec against wall      pect stretch [id number]        Ulnar nerve glide  X 10 on R side   Ulnar nerve glides   In supine    UBE    Level 1  3/3  Level 1  4/4 Level 2  4/4  L2, 4/4   Median nerve glide      In supine with asst, against wall                       MANUAL THERAPY: (15 minutes): c6/7 jnt mobs, grd 2. Emphasis on rot. Median nerve glide in supine. MODALITIES: (0 minutes):       Cold Pack Therapy in order to provide analgesia and reduce inflammation and edema to B upper traps with patient supine with wedge under legs. Skin was clear and intact.         [employer] Portal  Treatment/Session Summary:  Emphasis today on occupational apprehension since she is going back to work on Monday. She is not restricted in C6/7 however she is demonstrating C6/7 distribution irritation on R side. .  Although worked out from ulnar and median nerve glides she did feel relief from symptoms in thumb/middle and medial part of hand. HEP given for nerve glides. · Response to Treatment:  Patient with good relief from heavy numbness and tingling. Patient to continue with stretches at home. · Communication/Consultation:  None today  · Equipment provided today:  None today  · Recommendations/Intent for next treatment session: Next visit will focus on ROM, strengthening, and mobility. · Inquire about days at work and specifically carrying items/ weight of items/ endurance of 8 hr shifts.        Total Treatment Billable Duration: 40 minutes      Lelo Arreola DPT    Future Appointments   Date Time Provider Stacey Flores   7/11/2019  3:15 PM Jane Brunner DPT [facility] SFTANA   7/17/2019  9:30 PM SORAIDA MRI UNIT 1 [facility]   8/14/2019  8:50 AM 10 [facility] LAB SSA 10 [facility] 10 [facility]   8/21/2019  8:15 AM Lisa Luther MD SSA 10 [facility] 10 [facility]

## 2019-07-17 ENCOUNTER — HOSPITAL ENCOUNTER (OUTPATIENT)
Dept: MRI IMAGING | Age: 54
Discharge: HOME | End: 2019-07-17
Attending: INTERNAL MEDICINE
Payer: COMMERCIAL

## 2019-07-17 DIAGNOSIS — S19.9XXD INJURY OF NECK, SUBSEQUENT ENCOUNTER: ICD-10-CM

## 2019-07-17 DIAGNOSIS — M54.12 CERVICAL RADICULOPATHY, CHRONIC: ICD-10-CM

## 2019-07-17 DIAGNOSIS — M48.062 PSEUDOCLAUDICATION: ICD-10-CM

## 2019-07-17 DIAGNOSIS — M54.2 NECK PAIN: ICD-10-CM

## 2019-07-17 PROCEDURE — 72141 MRI NECK SPINE W/O DYE: CPT

## 2019-07-19 ENCOUNTER — HOSPITAL ENCOUNTER (OUTPATIENT)
Dept: PHYSICAL THERAPY | Age: 54
Discharge: HOME | End: 2019-07-19
Attending: INTERNAL MEDICINE
Payer: COMMERCIAL

## 2019-07-19 PROCEDURE — 97110 THERAPEUTIC EXERCISES: CPT

## 2019-07-19 PROCEDURE — 97140 MANUAL THERAPY 1/> REGIONS: CPT

## 2019-07-19 NOTE — PROGRESS NOTES
Destin Rodolfo [employer]  : 1965  Primary: Luis Meek [facility]  Secondary:  [address]  at [facility]  Dayne Do [facility] [address], [address], [place], [address] [facility]  [phone]   [phone]        OUTPATIENT PHYSICAL THERAPY: Daily Treatment Note 2019  Visit Count:  8        Pre-treatment Symptoms/Complaints:   Patient reports she returned to work this week and it has been pretty tough. 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 Tightness continues in neck and upper back. Working on [employer]. Using ice and heat at home some. Pain level 4-5/10 today. Pretty uncomfortable. Pain: Initial:   4-5/10 Post Session: 2/10 feel much better   Medications Last Reviewed:  2019  Updated Objective Findings:    TREATMENT:     THERAPEUTIC EXERCISE: ( 15 minutes):  Exercises per grid below to improve mobility, strength, balance and coordination. Required maximal verbal and manual cues to promote proper body alignment, promote proper body posture and promote proper body mechanics. Progressed range and complexity of movement as indicated. Date:  19 Date:  19 Date:  19 Date:  19 Date:  19 Date:  19 Date  19   Activity/Exercise Parameters Parameters Parameters       Neck stretch [id number]  3 x 30 sec against wall       pect stretch [id number]         Ulnar nerve glide  X 10 on R side   Ulnar nerve glides   In supine Standing x 10    UBE    Level 1  3/3  Level 1  4/4 Level 2  4/4  L2, 4/4 Level 0  3/3 minutes forward and back dumont     Median nerve glide      In supine with asst, against wall Standing x 10                         MANUAL THERAPY: (25 minutes): Patient prone with pillows under feet for STM with hands, GuaSha tool and boom weighted bar to right more than left upper back, Hands and GuaSha tool to bilateral cervical area and right upper trap and scapular border. Trigger point release with boom stick along right scapular border.      MODALITIES: (10 minutes):       Cold Pack Therapy in order to provide analgesia and reduce inflammation and edema to B upper traps with patient supine with wedge under legs. Skin was clear and intact. [employer] Portal  Treatment/Session Summary:  Compliant with HEP. Tolerated treatment well with decreased pain and tightness. · Response to Treatment:  Patient with good relief. · Communication/Consultation:  None today  · Equipment provided today:  None today  · Recommendations/Intent for next treatment session: Next visit will focus on ROM, strengthening, and mobility. · Inquire about days at work and specifically carrying items/ weight of items/ endurance of 8 hr shifts.        Total Treatment Billable Duration: 40 minutes   PT Patient Time In/Time Out  Time In: 0249  Time Out: [address], PTA    Future Appointments   Date Time Provider Stacey Flores   7/25/2019  3:15 PM Linette Bonilla DPT [facility]   8/14/2019  8:50 AM [facility]   8/21/2019  8:15 AM Geovani Shafer MD [facility]

## 2019-07-20 NOTE — PROGRESS NOTES
Your MRI looks good, thankfully. Are you feeling any better? Please come in for a check up in person if not.

## 2019-07-22 NOTE — PROGRESS NOTES
I am accessing Ms. Monroy's chart as a part of our department's internal chart auditing process. I certify that Ms. Monroy is, or was, a patient in our department.   Thank you,  Usha Yeboah, PT  7/22/2019    Quarter III 2019

## 2019-07-23 ENCOUNTER — HOSPITAL ENCOUNTER (OUTPATIENT)
Dept: PHYSICAL THERAPY | Age: 54
Discharge: HOME | End: 2019-07-23
Attending: INTERNAL MEDICINE
Payer: COMMERCIAL

## 2019-07-23 PROCEDURE — 97140 MANUAL THERAPY 1/> REGIONS: CPT

## 2019-07-23 PROCEDURE — 97110 THERAPEUTIC EXERCISES: CPT

## 2019-07-24 NOTE — PROGRESS NOTES
Kaylen Arlene [employer]  : 1965  Primary: Malick [facility]  Secondary:  [address]  at [facility]  Dayne Do [address], [address], [place], [address] [facility]  [phone]   [phone]        OUTPATIENT PHYSICAL THERAPY: Daily Treatment Note 2019  Visit Count:  9        Pre-treatment Symptoms/Complaints:   Patient reports she returned to work this week and it has been pretty tough. 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 Tightness continues in neck and upper back. Working on [employer]. Using ice and heat at home some. Pain level 4-5/10 today. Pretty uncomfortable. Pain: Initial:   4-5/10 Post Session: 210 feel much better   Medications Last Reviewed:  2019  Updated Objective Findings:    TREATMENT:     THERAPEUTIC EXERCISE: ( 10 minutes):  Exercises per grid below to improve mobility, strength, balance and coordination. Required maximal verbal and manual cues to promote proper body alignment, promote proper body posture and promote proper body mechanics. Progressed range and complexity of movement as indicated. Date:  19 Date:  19 Date:  19 Date:  19 Date:  19 Date:  19 Date  19 Date  19   Activity/Exercise Parameters Parameters Parameters        Neck stretch [id number]  3 x 30 sec against wall        pect stretch [id number]          Ulnar nerve glide  X 10 on R side   Ulnar nerve glides   In supine Standing x 10 Standing x 10 reps    UBE    Level 1  3/3  Level 1  4/4 Level 2  4/4  L2, 4/4 Level 0  3/3 minutes forward and back dumont   Level 0  3/3 minutes forward and backward   Median nerve glide      In supine with asst, against wall Standing x 10 X 10 reps                           MANUAL THERAPY: (25 minutes): Patient requested not to be prone for treatment due to boot and pain in LE.  Patient sitting for STM with hands and large GuaSha tool and hands  to right more than left upper trap, cervical paraspinals, SCM and scalenes, scapular border and right upper arm. Patient then supine for STM to to B upper traps, cervical paraspinals, SCM and scalenes, suboccipital area followed by suboccipital release and gentle cervical distraction. All to help decrease pain and tightness and help improve mobility. Gentle passive cervical rotation B. MODALITIES: (10 minutes): Patient supine with knee wedge in place for moist heat to neck and back to help increase blood flow and help decrease tightness and pain. [employer] Portal  Treatment/Session Summary:  Patient reporting decreased pain and improved mobility following treatment. Slowly progressing. Continue and progress with exercise program as patient tolerates for postural strengthening and cervical ROM. · Response to Treatment:  Patient with good relief following treatment. · Communication/Consultation:  None today  · Equipment provided today:  None today  · Recommendations/Intent for next treatment session: Next visit will focus on ROM, strengthening, and mobility. · Inquire about days at work and specifically carrying items/ weight of items/ endurance of 8 hr shifts.        Total Treatment Billable Duration: 40 minutes   PT Patient Time In/Time Out  Time In: 1430  Time Out: [address], PTA    Future Appointments   Date Time Provider Stacey Flores   7/31/2019  4:00 PM Simón [employer], [facility] SFD   8/2/2019  3:15 PM [place] [facility]   8/5/2019  4:00 PM [place] SFD   8/9/2019  4:00 PM [employer]ón [employer], [facility] SFD   8/13/2019  4:00 PM Jane Brunner DPT [facility] SFD   8/14/2019  8:50 AM [facility] LAB [facility]   8/21/2019  8:15 AM Lisa Luther MD [facility]

## 2019-07-25 ENCOUNTER — APPOINTMENT (OUTPATIENT)
Dept: PHYSICAL THERAPY | Age: 54
End: 2019-07-25
Attending: INTERNAL MEDICINE
Payer: COMMERCIAL

## 2019-07-29 ENCOUNTER — APPOINTMENT (OUTPATIENT)
Dept: PHYSICAL THERAPY | Age: 54
End: 2019-07-29
Attending: INTERNAL MEDICINE
Payer: COMMERCIAL

## 2019-07-31 ENCOUNTER — HOSPITAL ENCOUNTER (OUTPATIENT)
Dept: PHYSICAL THERAPY | Age: 54
Discharge: HOME | End: 2019-07-31
Attending: INTERNAL MEDICINE
Payer: COMMERCIAL

## 2019-07-31 PROCEDURE — 97110 THERAPEUTIC EXERCISES: CPT

## 2019-07-31 PROCEDURE — 97140 MANUAL THERAPY 1/> REGIONS: CPT

## 2019-07-31 NOTE — PROGRESS NOTES
Cali Norris [employer]  : 1965  Primary: Zeferino Whiting [facility]  Secondary:  [address] at [facility]  [address], [address], [place], [address] [facility]  [phone]   [phone]        OUTPATIENT PHYSICAL THERAPY: Daily Treatment Note 2019  Visit Count:  10        Pre-treatment Symptoms/Complaints:   Patient reports work is tough. She rates the irritation/ numbness and tingling to be 4-5/10. Her biggest complaint is tightness throughout her entire R shoulder and arm. Pain: Initial:   4-5/10 Post Session: 2-3/10 feel much better   Medications Last Reviewed:  2019  Updated Objective Findings:    TREATMENT:     THERAPEUTIC EXERCISE: ( 10 minutes):  Exercises per grid below to improve mobility, strength, balance and coordination. Required maximal verbal and manual cues to promote proper body alignment, promote proper body posture and promote proper body mechanics. Progressed range and complexity of movement as indicated. Date:  19 Date:  19 Date:  19 Date:  19 Date  19 Date  19 Date:  19   Activity/Exercise Parameters         Neck stretch 3 x 30 sec against wall         pect stretch          Ulnar nerve glide  Ulnar nerve glides   In supine Standing x 10 Standing x 10 reps     UBE  Level 1  3/3  Level 1  4/4 Level 2  4/4  L2, 4/4 Level 0  3/3 minutes forward and back dumont   Level 0  3/3 minutes forward and backward Level 1  4/4 minutes    Median nerve glide    In supine with asst, against wall Standing x 10 X 10 reps X 10                          MANUAL THERAPY: (35 minutes): Patient in supine with LE's on wedge. Patient in supine for STM and myofascial work to R arm from R upper trap to R elbow, working on increasing mobility and decreasing tightness and numbness. Notable tightness throughout R arm even into upper traps and pects region.      MODALITIES: (10 minutes): Patient supine with knee wedge in place for cold pack to R upper trap and R upper arm to help increase blood flow and help decrease tightness and pain. Skin was clear and intact. [email or web address] Portal  Treatment/Session Summary:  Patient reporting decreased pain and improved mobility following treatment. Slowly progressing. Continue and progress with exercise program as patient tolerates for postural strengthening and cervical ROM. · Response to Treatment:  Patient with good relief following treatment. · Communication/Consultation:  None today  · Equipment provided today:  None today  · Recommendations/Intent for next treatment session: Next visit will focus on ROM, strengthening, and mobility. · Inquire about days at work and specifically carrying items/ weight of items/ endurance of 8 hr shifts.        Total Treatment Billable Duration: 45 minutes   PT Patient Time In/Time Out  Time In: 1600  Time Out: TERESA/ Mak Brizuela, LUDIVINA    Future Appointments   Date Time Provider Stacey Flores   8/1/2019  4:00 PM Caesar Herring DPT [facility] SFD   8/5/2019  4:00 PM Domenico Lozano PTA [facility] SFD   8/9/2019  4:00 PM Domenico Lozano [facility] SFD   8/12/2019  4:00 PM Richard Dennis [facility] SFD   8/14/2019  8:50 AM [facility] LAB SSA [facility]   8/14/2019  3:15 PM Domenico Lozano [facility] SFD   8/21/2019  8:15 AM Rosalina Cat MD [place] [facility]

## 2019-08-01 ENCOUNTER — HOSPITAL ENCOUNTER (OUTPATIENT)
Dept: PHYSICAL THERAPY | Age: 54
Discharge: HOME | End: 2019-08-01
Attending: INTERNAL MEDICINE
Payer: COMMERCIAL

## 2019-08-01 PROCEDURE — 97164 PT RE-EVAL EST PLAN CARE: CPT

## 2019-08-01 PROCEDURE — 97140 MANUAL THERAPY 1/> REGIONS: CPT

## 2019-08-01 PROCEDURE — 97110 THERAPEUTIC EXERCISES: CPT

## 2019-08-01 NOTE — PROGRESS NOTES
Oliverio Flores [employer]  : 1965  Primary: Bria Aguilera [facility]  Secondary:  [address] at [address] [facility] [address], [address], [place], [address] [facility]  [phone]   [phone]        OUTPATIENT PHYSICAL THERAPY: Daily Treatment Note 2019  Visit Count:  11        Pre-treatment Symptoms/Complaints:   Patient reports her work has taken a toll. She reports some numbness at C6 distribution across entire R thumb and hand. Pain: Initial:   4/10 Post Session: 1/10 feel much better   Medications Last Reviewed:  2019  Updated Objective Findings:    TREATMENT:     THERAPEUTIC EXERCISE: ( 10 minutes):  Exercises per grid below to improve mobility, strength, balance and coordination. Required maximal verbal and manual cues to promote proper body alignment, promote proper body posture and promote proper body mechanics. Progressed range and complexity of movement as indicated. Date:  19 Date:  19 Date:  19 Date:  19 Date:  19 Date:  19 Date  19 Date  19 Date:  19   Activity/Exercise Parameters Parameters Parameters         Neck stretch [id number]  3 x 30 sec against wall      In supine and wall, with flex for C6 emphasis,    pect stretch [id number]           Ulnar nerve glide  X 10 on R side   Ulnar nerve glides   In supine Standing x 10 Standing x 10 reps     UBE    Level 1  3/3  Level 1  4/4 Level 2  4/4  L2, 4/4 Level 0  3/3 minutes forward and back dumont   Level 0  3/3 minutes forward and backward    Median nerve glide      In supine with asst, against wall Standing x 10 X 10 reps             Foam roll-static, cerv rot stretch/ROM                 MANUAL THERAPY: (5 minutes):   C6, rot/SB bilat  Patient requested not to be prone for treatment due to boot and pain in LE.  Patient sitting for STM with hands and large GuaSha tool and hands  to right more than left upper trap, cervical paraspinals, SCM and scalenes, scapular border and right upper arm. Patient then supine for STM to to B upper traps, cervical paraspinals, SCM and scalenes, suboccipital area followed by suboccipital release and gentle cervical distraction. All to help decrease pain and tightness and help improve mobility. Gentle passive cervical rotation B. MODALITIES: (0 minutes): Patient supine with knee wedge in place for moist heat to neck and back to help increase blood flow and help decrease tightness and pain. [employer] Portal  Treatment/Session Summary:  Patient reporting decreased pain and improved mobility following treatment. Slowly progressing. Continue and progress with exercise program as patient tolerates for postural strengthening and cervical ROM. · Response to Treatment:  Patient with good relief following treatment. · Communication/Consultation:  None today  · Equipment provided today:  None today  · Recommendations/Intent for next treatment session: Next visit will focus on ROM, strengthening, and mobility. · Inquire about days at work and specifically carrying items/ weight of items/ endurance of 8 hr shifts.        Total Treatment Billable Duration: 40 minutes   PT Patient Time In/Time Out  Time In: 1600  Time Out: [address] [facility], [facility]    Future Appointments   Date Time Provider Stacey Flores   8/5/2019  4:00 PM Roxy Nieves [facility] SFD   8/9/2019  4:00 PM Roxy Nieves [facility] SFD   8/12/2019  4:00 PM Bran Mai [facility] SFD   8/14/2019  8:50 AM [facility] LAB SSA [facility]   8/14/2019  3:15 PM Roxy Nieves [facility] SFD   8/21/2019  8:15 AM Bob Power MD [place] [facility]

## 2019-08-01 NOTE — THERAPY EVALUATION
Maximo Collins  : 1965  Primary: Sangita Roman [facility]  Secondary:  [address] at [facility], [address], [place], [address] [facility]  [phone]   [phone]          OUTPATIENT PHYSICAL THERAPY:Re-evaluation 2019   ICD-10: Treatment Diagnosis: Pain in left shoulder (M25.512)  Shoulder lesion, unspecified, left shoulder (M75.92)  Stiffness of left shoulder, not elsewhere classified (M25.612)    Precautions/Allergies:   Adhesive; Chlorhexidine; Iodine; and Nickel   TREATMENT PLAN:  Effective Dates: 2019 TO 2019 (90 days). Frequency/Duration: 2 times a week for 90 Day(s) MEDICAL/REFERRING DIAGNOSIS:  Cervical radiculopathy [M54.12]   DATE OF ONSET: 19  REFERRING PHYSICIAN: Derik Orellana MD MD Orders: PT referal  Return MD Appointment: unknown     INITIAL ASSESSMENT:  Ms. Gama Collins presents  with bilateral shoulder pain including numbness and tingling down arms into hands and all fingers. She has improved with ROM to 75% in all directions, improved NDI score to 10/50 from . All short term goals have been met. She still has limitation of R foot injury, just getting boot off today. Her foot is limiting factor for further cervical improvement. She is a candidate for further skilled PT is indicated to improve present condition and more comfortable ADLs . PROBLEM LIST (Impacting functional limitations):  1. Decreased Strength  2. Decreased ADL/Functional Activities  3. Increased Pain  4. Decreased Activity Tolerance  5. Decreased Flexibility/Joint Mobility INTERVENTIONS PLANNED: (Treatment may consist of any combination of the following)  1. Home Exercise Program (HEP)  2. Manual Therapy  3. Neuromuscular Re-education/Strengthening  4. Range of Motion (ROM)  5. Therapeutic Activites  6. Therapeutic Exercise/Strengthening     GOALS: (Goals have been discussed and agreed upon with patient.)  Time Frame: 30 days   1.  Decrease pain below 3/10 in  bilat shoulder, cervical with beginning home ADL's / shoulder/cervical mobility. GOAL MET 8/1/19  2. Decrease NDI from 19 to 10 in the right shoulder to indicate functional improvement and to demonstrate improved range of motion and functional improvement of the shoulder. GOAL MET 8/1/19  3. Patient to be independent with an initial HEP for the shoulder and cervical for mobility and strengthening. GOAL MET 8/1/19  DISCHARGE GOALS:   90 days    1. Pt will reduce score on NDI to 5/50 in order to demonstrate improved functional mobility and quality of life. 2. Pt will report working for > 8hrs with minimal to no increase in pain in order to be able to stand for prolonged periods as needed to perform work activities. 3. Patient to demonstrate increased cervcial ROM to 100%. OUTCOME MEASURE:   Tool Used: Neck Disability Index (NDI)  Score:  Initial: 19/50  Most Recent: 10/50 (Date: 8/1/19 )   Interpretation of Score: The Neck Disability Index is a revised form of the Oswestry Low Back Pain Index and is designed to measure the activities of daily living in person's with neck pain. Each section is scored on a 0-5 scale, 5 representing the greatest disability. The scores of each section are added together for a total score of 50. MEDICAL NECESSITY:   · Skilled intervention continues to be required due to decreased function. REASON FOR SERVICES/OTHER COMMENTS:  · Patient continues to require skilled intervention due to medical complications and patient unable to attend/participate in therapy as expected. Total Duration:       Rehabilitation Potential For Stated Goals: Excellent  Regarding Elisabeth Fanny Lean's therapy, I certify that the treatment plan above will be carried out by a therapist or under their direction.   Thank you for this referral,  Kandis Partida DPT     Referring Physician Signature: Bhavana Jordan MD _______________________________ Date _____________ PAIN/SUBJECTIVE:   Initial:   2/10 Post Session:  0/10   HISTORY:   History of Injury/Illness (Reason for Referral):  Pt states 1 week ago she woke up with numbness/tingling in bilat arms, hands, fingers but more R/L. She has previous episodes she can remember including PT sessions. This session has never had N/T. She is recovering from recent laminectomy surgery and just returned to work last week. She has more pain when waking up. Past Medical History/Comorbidities:   Ms. Mikki Ambrocio  has a past medical history of Agatston coronary artery calcium score less than 100 (11/16/2011), FH: thyroid disease (4/12/2013), History of melanoma (4/12/2013), HTN (hypertension) (9/12/2012), Hyperlipidemia (10/9/2015), Hypertension, Overweight(278.02) (4/12/2013), Spinal stenosis of lumbar region (4/12/2013), and Trigeminal neuralgia (4/12/2013). Ms. Mikki Ambrocio  has a past surgical history that includes hx orthopaedic; pr breast surgery procedure unlisted; hx tonsillectomy; hx breast biopsy; hx other surgical (Feb 2014); and hx other surgical (2014). Social History/Living Environment:     she lives alone, IND, drives, works. Prior Level of Function/Work/Activity:  Pt prior to surgery, completely IND, however past month she has been sedentary, minimal activity. Yesterday she broke 3 toes in L foot when stepping on a malfunctioning water meter plate. Ambulatory/Rehab Services H2 Model Falls Risk Assessment   Risk Factors:  Click here to CLEAR selections Ability to Rise from Chair:       (1)  Pushes up, successful in one attempt   Falls Prevention Plan:       Physical Limitations to Exercise (specify):  limited by foot injuries       Mobility Assistance Device (specify):  camBoot on L   Total: (5 or greater = High Risk): 1   ©2010 [facility] [employer] Patent #[id number].  Federal Law prohibits the replication, distribution or use without written permission from [employer] [employer]   Current Medications:       Current Outpatient Medications:     valACYclovir (VALTREX) 1 gram tablet, TAKE ONE TABLET BY MOUTH TWICE A DAY AS NEEDED, Disp: 60 Tab, Rfl: 1    zolpidem (AMBIEN) 5 mg tablet, Take 1/2 to 1 tablet by mouth daily at bedtime as needed for sleep, Disp: 30 Tab, Rfl: 1    aspirin 81 mg chewable tablet, Take 81 mg by mouth daily. , Disp: , Rfl:     OTHER, Olapatadine eye drop, Disp: , Rfl:     FLUoxetine (PROZAC) 20 mg capsule, Take 1 Cap by mouth daily. , Disp: 90 Cap, Rfl: 3    efinaconazole (JUBLIA EX), by Apply Externally route., Disp: , Rfl:     clotrimazole-betamethasone (LOTRISONE) topical cream, Apply  to affected area two (2) times a day., Disp: 15 g, Rfl: 0    ospemifene (OSPHENA) 60 mg tab tablet, Take 60 mg by mouth daily (with breakfast). , Disp: , Rfl:     ALPRAZolam (XANAX) 0.5 mg tablet, Take 1 Tab by mouth nightly as needed. Max Daily Amount: 0.5 mg., Disp: 30 Tab, Rfl: 5    hydroCHLOROthiazide (HYDRODIURIL) 25 mg tablet, TAKE ONE TABLET BY MOUTH ONE TIME DAILY, Disp: 90 Tab, Rfl: 3    potassium chloride SR (KLOR-CON 10) 10 mEq tablet, TAKE ONE TABLET BY MOUTH ONE TIME DAILY, Disp: 90 Tab, Rfl: 3    potassium chloride (KLOR-CON) 10 mEq tablet, Take 1 Tab by mouth daily. , Disp: 90 Tab, Rfl: 3    naproxen (NAPROSYN) 500 mg tablet, Take 1 Tab by mouth two (2) times daily (with meals). , Disp: 60 Tab, Rfl: 1    losartan (COZAAR) 50 mg tablet, Take 1 Tab by mouth daily. , Disp: 90 Tab, Rfl: 3    propranolol (INDERAL) 10 mg tablet, Take 1 Tab by mouth daily as needed. , Disp: 30 Tab, Rfl: 3    OTHER, Indications: airborne origional, Disp: , Rfl:     docusate sodium (STOOL SOFTENER) 100 mg capsule, Take 100 mg by mouth two (2) times a day., Disp: , Rfl:     hydrocortisone valerate (WESTCORT) 0.2 % topical cream, APPLY TOPICALLY TO RASH ON FACE TWO TIMES A DAY UNTIL CLEAR THEN AS NEEDED FOR FLARES, Disp: , Rfl: 3    cholecalciferol (VITAMIN D3) 1,000 unit cap, Take 1 Cap by mouth daily., Disp: , Rfl:     fish oil-omega-3 fatty acids 340-1,000 mg capsule, Take  by mouth daily. , Disp: , Rfl:    Date Last Reviewed:  8/1/2019     Number of Personal Factors/Comorbidities that affect the Plan of Care: 0: LOW COMPLEXITY   EXAMINATION:   Palpation:          Tender/tight R paracervical  ROM:          75% motion with cervical SBR and rot R, WNL to L. Body Structures Involved:  1. Nerves  2. Bones  3. Joints  4. Muscles  5. Ligaments Body Functions Affected:  1. Sensory/Pain  2. Neuromusculoskeletal  3. Movement Related Activities and Participation Affected:  1.  General Tasks and Demands   Number of elements (examined above) that affect the Plan of Care: 1-2: LOW COMPLEXITY   CLINICAL PRESENTATION:   Presentation: Stable and uncomplicated: LOW COMPLEXITY   CLINICAL DECISION MAKING:   Use of outcome tool(s) and clinical judgement create a POC that gives a: Clear prediction of patient's progress: LOW COMPLEXITY

## 2019-08-02 ENCOUNTER — APPOINTMENT (OUTPATIENT)
Dept: PHYSICAL THERAPY | Age: 54
End: 2019-08-02
Attending: INTERNAL MEDICINE
Payer: COMMERCIAL

## 2019-08-05 ENCOUNTER — HOSPITAL ENCOUNTER (OUTPATIENT)
Dept: PHYSICAL THERAPY | Age: 54
Discharge: HOME | End: 2019-08-05
Attending: INTERNAL MEDICINE
Payer: COMMERCIAL

## 2019-08-05 NOTE — PROGRESS NOTES
Daniel Sharpe [employer]  : 1965  Primary: Roberto Banks  Secondary:  [address]  at [facility] [address], [address] [facility], [address]  [phone]   [phone]      2019     Patient was scheduled for therapy today, but called before today to cancel this appointment. Will follow up at the next visit.   Brigette Marmolejo, PTA

## 2019-08-09 ENCOUNTER — APPOINTMENT (OUTPATIENT)
Dept: PHYSICAL THERAPY | Age: 54
End: 2019-08-09
Attending: INTERNAL MEDICINE
Payer: COMMERCIAL

## 2019-08-12 ENCOUNTER — APPOINTMENT (OUTPATIENT)
Dept: PHYSICAL THERAPY | Age: 54
End: 2019-08-12
Attending: INTERNAL MEDICINE
Payer: COMMERCIAL

## 2019-08-13 ENCOUNTER — APPOINTMENT (OUTPATIENT)
Dept: PHYSICAL THERAPY | Age: 54
End: 2019-08-13
Attending: INTERNAL MEDICINE
Payer: COMMERCIAL

## 2019-08-14 ENCOUNTER — APPOINTMENT (OUTPATIENT)
Dept: PHYSICAL THERAPY | Age: 54
End: 2019-08-14
Attending: INTERNAL MEDICINE
Payer: COMMERCIAL

## 2019-08-14 ENCOUNTER — HOSPITAL ENCOUNTER (OUTPATIENT)
Dept: LAB | Age: 54
Discharge: HOME | End: 2019-08-14
Payer: COMMERCIAL

## 2019-08-14 LAB — TSH SERPL DL<=0.005 MIU/L-ACNC: 1.74 UIU/ML (ref 0.36–3.74)

## 2019-08-14 PROCEDURE — 84443 ASSAY THYROID STIM HORMONE: CPT

## 2019-08-20 NOTE — THERAPY DISCHARGE
Maximo Singh [employer]  : 1965  Primary: Sangita Roman [facility]  Secondary:  [address] at [facility] [address], [address], [place], [address] [facility]  [phone]   [phone]          OUTPATIENT PHYSICAL THERAPY:Initial Assessment and Discharge Summary 2019   ICD-10: Treatment Diagnosis: Pain in left shoulder (M25.512)  Shoulder lesion, unspecified, left shoulder (M75.92)  Stiffness of left shoulder, not elsewhere classified (M25.612)    Precautions/Allergies:   Adhesive; Chlorhexidine; Iodine; and Nickel   TREATMENT PLAN:  Effective Dates: 2019 TO 2019 (90 days). Frequency/Duration: 2 times a week for 90 Day(s) MEDICAL/REFERRING DIAGNOSIS:  Cervical radiculopathy [M54.12]   DATE OF ONSET: 19  REFERRING PHYSICIAN: Derik Orellana MD MD Orders: PT referal  Return MD Appointment: unknown     INITIAL ASSESSMENT:  Ms. Evi Quiroz has been seen in physical therapy from 19 to 19 for Visit count could not be calculated. Make sure you are using a visit which is associated with an episode. visits. Treatment has been discontinued at this time due to patient failing to return for additional treatment. The below goals were met prior to discontinuation. Some goals were not met due to dc. Thank you for this referral.           PROBLEM LIST (Impacting functional limitations):  1. Decreased Strength  2. Decreased ADL/Functional Activities  3. Increased Pain  4. Decreased Activity Tolerance  5. Decreased Flexibility/Joint Mobility INTERVENTIONS PLANNED: (Treatment may consist of any combination of the following)  1. Home Exercise Program (HEP)  2. Manual Therapy  3. Neuromuscular Re-education/Strengthening  4. Range of Motion (ROM)  5. Therapeutic Activites  6. Therapeutic Exercise/Strengthening     GOALS: (Goals have been discussed and agreed upon with patient.)  Time Frame: 30 days   1.  Decrease pain below 3/10 in  bilat shoulder, cervical with beginning home ADL's / shoulder/cervical mobility. 2. Decrease NDI from 19 to 10 in the right shoulder to indicate functional improvement and to demonstrate improved range of motion and functional improvement of the shoulder. 3. Patient to be independent with an initial HEP for the shoulder and cervical for mobility and strengthening. DISCHARGE GOALS:   90 days    1. Pt will reduce score on NDI to 5/50 in order to demonstrate improved functional mobility and quality of life. 2. Pt will report working for > 8hrs with minimal to no increase in pain in order to be able to stand for prolonged periods as needed to perform work activities. 3. Patient to demonstrate increased cervcial ROM to 100%. OUTCOME MEASURE:   Tool Used: Neck Disability Index (NDI)  Score:  Initial: 19/50  Most Recent: X/50 (Date: -- )   Interpretation of Score: The Neck Disability Index is a revised form of the Oswestry Low Back Pain Index and is designed to measure the activities of daily living in person's with neck pain. Each section is scored on a 0-5 scale, 5 representing the greatest disability. The scores of each section are added together for a total score of 50. MEDICAL NECESSITY:   · Skilled intervention continues to be required due to decreased function. REASON FOR SERVICES/OTHER COMMENTS:  · Patient continues to require skilled intervention due to medical complications and patient unable to attend/participate in therapy as expected. Total Duration:  PT Patient Time In/Time Out  Time In: 1600  Time Out: 1655    Rehabilitation Potential For Stated Goals: Excellent  Regarding Elisabeth Escobedo Porfirio's therapy, I certify that the treatment plan above will be carried out by a therapist or under their direction.   Thank you for this referral,  Tiffany Szymanski DPT     Referring Physician Signature: Derik Orellana MD _______________________________ Date _____________

## 2019-08-21 PROBLEM — S92.415A CLOSED NONDISPLACED FRACTURE OF PROXIMAL PHALANX OF LEFT GREAT TOE: Status: ACTIVE | Noted: 2019-07-08

## 2019-08-21 PROBLEM — S99.922A INJURY OF TOE ON LEFT FOOT: Status: ACTIVE | Noted: 2019-06-19

## 2019-08-21 PROBLEM — Z98.1 S/P LUMBAR FUSION: Status: ACTIVE | Noted: 2019-05-01

## 2019-08-28 ENCOUNTER — HOSPITAL ENCOUNTER (OUTPATIENT)
Dept: MRI IMAGING | Age: 54
Discharge: HOME | End: 2019-08-28
Attending: INTERNAL MEDICINE
Payer: COMMERCIAL

## 2019-08-28 DIAGNOSIS — M54.12 C6 RADICULOPATHY: ICD-10-CM

## 2019-08-28 PROCEDURE — A9575 INJ GADOTERATE MEGLUMI 0.1ML: HCPCS | Performed by: INTERNAL MEDICINE

## 2019-08-28 PROCEDURE — 72156 MRI NECK SPINE W/O & W/DYE: CPT

## 2019-08-28 PROCEDURE — 74011250636 HC RX REV CODE- 250/636: Performed by: INTERNAL MEDICINE

## 2019-08-28 RX ORDER — SODIUM CHLORIDE 0.9 % (FLUSH) 0.9 %
10 SYRINGE (ML) INJECTION
Status: COMPLETED | OUTPATIENT
Start: 2019-08-28 | End: 2019-08-28

## 2019-08-28 RX ORDER — GADOTERATE MEGLUMINE 376.9 MG/ML
17 INJECTION INTRAVENOUS
Status: COMPLETED | OUTPATIENT
Start: 2019-08-28 | End: 2019-08-28

## 2019-08-28 RX ADMIN — GADOTERATE MEGLUMINE 17 ML: 376.9 INJECTION INTRAVENOUS at 12:31

## 2019-08-28 RX ADMIN — Medication 10 ML: at 12:32

## 2019-08-31 NOTE — PROGRESS NOTES
Your neck mri looks good. NO worrisome changes. Now if we could just get you FEELING better, that would be great. It will come with time.

## 2019-12-03 ENCOUNTER — HOSPITAL ENCOUNTER (OUTPATIENT)
Dept: MRI IMAGING | Age: 54
Discharge: HOME | End: 2019-12-03
Attending: INTERNAL MEDICINE
Payer: COMMERCIAL

## 2019-12-03 DIAGNOSIS — M25.511 CHRONIC RIGHT SHOULDER PAIN: ICD-10-CM

## 2019-12-03 DIAGNOSIS — G89.29 CHRONIC RIGHT SHOULDER PAIN: ICD-10-CM

## 2019-12-03 PROCEDURE — 73221 MRI JOINT UPR EXTREM W/O DYE: CPT

## 2020-08-20 ENCOUNTER — HOSPITAL ENCOUNTER (OUTPATIENT)
Dept: SLEEP MEDICINE | Age: 55
Discharge: HOME | End: 2020-08-20
Payer: COMMERCIAL

## 2020-08-20 PROCEDURE — 95806 SLEEP STUDY UNATT&RESP EFFT: CPT

## 2020-11-23 PROBLEM — G47.33 OSA (OBSTRUCTIVE SLEEP APNEA): Status: ACTIVE | Noted: 2020-11-23

## 2022-03-18 PROBLEM — S92.415A CLOSED NONDISPLACED FRACTURE OF PROXIMAL PHALANX OF LEFT GREAT TOE: Status: ACTIVE | Noted: 2019-07-08

## 2022-03-19 PROBLEM — G47.33 OSA (OBSTRUCTIVE SLEEP APNEA): Status: ACTIVE | Noted: 2020-11-23

## 2022-03-19 PROBLEM — S99.922A INJURY OF TOE ON LEFT FOOT: Status: ACTIVE | Noted: 2019-06-19

## 2022-03-19 PROBLEM — Z98.1 S/P LUMBAR FUSION: Status: ACTIVE | Noted: 2019-05-01

## 2022-08-18 NOTE — PROGRESS NOTES
[address] [place] [address] Court, [address] [facility]  [phone]    Patient Name:  Hannah Ramirez  YOB: 1965      Office Visit 8/22/2022    CHIEF COMPLAINT:    Chief Complaint   Patient presents with    Sleep Apnea    Follow-up         HISTORY OF PRESENT ILLNESS:      Patient is a 63 yo female seen today for follow up of sleep apnea and CPAP therapy. She is prescribed cpap therapy with a humidifier set at 5-15 cm with a Nasal Pillow mask. Most recent download reveals AHI on PAP therapy is 3.4, leak is 9.7 and the hourly usage is 7 hours 38 minutes nightly. The overall use is 2718 hours with days greater than four hours at 350/365. The patient is compliant with the Pap therapy and is feeling better as a result. Patient reports she is doing well on CPAP therapy. She denies any daytime fatigue or morning headaches. [place] score is 0/24. She states she easily goes to sleep at night and is rejuvenated in the mornings. She may occasionally get up to use the bathroom but easily falls back asleep. She would like to get an order today for a travel CPAP as she is getting ready to travel to [facility]. She reports she has had a steady weight gain after having a fall/accident about 3 years ago. She has had a surgical back fusion done and states that this is getting better.   Her blood pressure is well controlled today        Sleep Medicine 8/22/2022 8/25/2021   Sitting and reading 0 0   Watching TV 0 0   Sitting, inactive in a public place (e.g. a theatre or a meeting) 0 0   As a passenger in a car for an hour without a break 0 0   Lying down to rest in the afternoon when circumstances permit 0 1   Sitting and talking to someone 0 0   Sitting quietly after a lunch without alcohol 0 0   In a car, while stopped for a few minutes in traffic 0 0   Total score 0 1        Past Medical History:   Diagnosis Date    Agatston coronary artery calcium score less than 100 11/16/2011 score: 0    FH: thyroid disease 4/12/2013    History of melanoma 4/12/2013    HTN (hypertension) 9/12/2012    Hyperlipidemia 10/9/2015    Hypertension     Overweight(278.02) 4/12/2013    Spinal stenosis of lumbar region 4/12/2013    Trigeminal neuralgia 4/12/2013         Patient Active Problem List   Diagnosis    Closed nondisplaced fracture of proximal phalanx of left great toe    Hyperlipidemia    History of melanoma    S/P lumbar fusion    Chronic left-sided low back pain with left-sided sciatica    Injury of toe on left foot    VINICIO (obstructive sleep apnea)    Trigeminal neuralgia    Obesity (BMI 30.0-34.9)    NSAID long-term use    Right hip pain    FH: thyroid disease    Spinal stenosis of lumbar region    HTN (hypertension)          Past Surgical History:   Procedure Laterality Date    BREAST BIOPSY      several    BREAST SURGERY      reduction    ORTHOPEDIC SURGERY      jacky knee scoped    OTHER SURGICAL HISTORY  2014    OTHER SURGICAL HISTORY  Feb 2014    uterine ablation and IUD removal    TONSILLECTOMY             Social History     Socioeconomic History    Marital status: Life Partner     Spouse name: Not on file    Number of children: Not on file    Years of education: Not on file    Highest education level: Not on file   Occupational History    Not on file   Tobacco Use    Smoking status: Never    Smokeless tobacco: Never   Substance and Sexual Activity    Alcohol use:  Yes     Alcohol/week: 0.0 standard drinks    Drug use: No    Sexual activity: Not on file   Other Topics Concern    Not on file   Social History Narrative    Not on file     Social Determinants of Health     Financial Resource Strain: Not on file   Food Insecurity: Not on file   Transportation Needs: Not on file   Physical Activity: Not on file   Stress: Not on file   Social Connections: Not on file   Intimate Partner Violence: Not on file   Housing Stability: Not on file         Family History   Problem Relation Age of Onset Diabetes Sister     Diabetes Father     Stroke Father     Bleeding Prob Paternal Uncle         breast         Allergies   Allergen Reactions    Povidone-Iodine Rash     Skin rash    Nickel Rash     rash  Other reaction(s): Rash-Allergy  rash    Chlorhexidine Rash    Iodine Rash     Topical iodine prep for surgery  Topical iodine prep for surgery         Current Outpatient Medications   Medication Sig    acetaminophen (TYLENOL) 500 MG tablet Take 500 mg by mouth 2 times daily    Estradiol (VAGIFEM) 10 MCG TABS vaginal tablet Place vaginally 2 times per week. For first start use qhs for 2 weeks then as above    conjugated estrogens (PREMARIN) 0.625 MG/GM vaginal cream Place 0.5 g vaginally    naproxen sodium (ANAPROX) 220 MG tablet Take 220 mg by mouth 2 times daily    valACYclovir (VALTREX) 1 g tablet     triamcinolone (NASACORT) 55 MCG/ACT nasal inhaler 2 sprays by Nasal route daily    ketotifen (ZADITOR) 0.025 % ophthalmic solution 1 drop 2 times daily    vitamin D 25 MCG (1000 UT) CAPS Take 1,000 Units by mouth daily    DULoxetine HCl 40 MG CPEP Take 60 mg by mouth daily    fexofenadine (ALLEGRA) 60 MG tablet Take 180 mg by mouth    gabapentin (NEURONTIN) 100 MG capsule Take 300 mg by mouth 3 times daily.     hydroCHLOROthiazide (HYDRODIURIL) 25 MG tablet TAKE ONE TABLET BY MOUTH ONE TIME DAILY    losartan (COZAAR) 50 MG tablet Take 50 mg by mouth daily    potassium chloride (KLOR-CON M) 10 MEQ extended release tablet Take 10 mEq by mouth daily    tretinoin (RETIN-A) 0.025 % cream Apply topically    topiramate (TOPAMAX) 25 MG tablet     albuterol sulfate  (90 Base) MCG/ACT inhaler Inhale 1 puff into the lungs every 4 hours as needed (Patient not taking: Reported on 8/22/2022)    celecoxib (CELEBREX) 200 MG capsule Take 200 mg by mouth daily (Patient not taking: Reported on 8/22/2022)    zolpidem (AMBIEN) 5 MG tablet Take 1/2 to 1 tablet by mouth daily at bedtime as needed for sleep (Patient not taking: Reported on 8/22/2022)     No current facility-administered medications for this visit. REVIEW OF SYSTEMS:   CONSTITUTIONAL:   There is no history of fever, chills, night sweats, weight loss, weight gain, persistent fatigue, or lethargy/hypersomnolence. CARDIAC:   No chest pain, pressure, discomfort, palpitations, orthopnea, murmurs, or edema. GI:   No dysphagia, heartburn reflux, nausea/vomiting, diarrhea, abdominal pain, or bleeding. NEURO:   There is no history of AMS, persistent headache, decreased level of consciousness, seizures, or motor or sensory deficits. PHYSICAL EXAM:    Vitals:    08/22/22 1523   BP: 116/74   Pulse: 87   Resp: 15   Temp: 97.2 °F (36.2 °C)   SpO2: 97%   Weight: 196 lb 11.2 oz (89.2 kg)   Height: 5' 3\" (1.6 m)        [unfilled]        GENERAL APPEARANCE:   The patient is normal weight and in no respiratory distress. HEENT:   PERRL. Conjunctivae unremarkable. Nasal mucosa is without epistaxis, exudate, or polyps. Nares patent bilateral.  Gums and dentition are unremarkable. There is oropharyngeal narrowing. Gee score 3. NECK/LYMPHATIC:   Symmetrical with no elevation of jugular venous pulsation. Trachea midline. No thyroid enlargement. No cervical adenopathy. LUNGS:   Normal respiratory effort with symmetrical lung expansion. Breath sounds clear. HEART:   There is a regular rate and rhythm. No murmur, rub, or gallop. There is no edema in the lower extremities. ABDOMEN:   Soft and non-tender. No hepatosplenomegaly. Bowel sounds are normal.     NEURO:   The patient is alert and oriented to person, place, and time. Memory appears intact and mood is normal.  No gross sensorimotor deficits are present. ASSESSMENT:  (Medical Decision Making)      Diagnosis Orders   1. VINICIO (obstructive sleep apnea)  DME - DURABLE MEDICAL EQUIPMENT    DME - DURABLE MEDICAL EQUIPMENT   Patient is using, compliant and benefiting from Pap therapy. Continue current settings as AHI is down to 3.4 events per hour. Will order travel CPAP machine that patient reports she will purchase either online or by supplier of her choice. PLAN:    Continue CPAP 5-15 cm H2O with nightly compliance  Supplies ordered  Follow up in 1 year or sooner if needed    Orders Placed This Encounter   Procedures    DME - [address] [place]  Phone: [address] [address]  Dept: [phone]      Patient Name: Filippo Joya  : 1965  Gender: female  Address: [address] [place] [id number]  Patient phone number: [phone] (home) [phone] (work)      Primary Insurance: Payor: Corby Mis / Plan: [address] / Product Type: *No Product type* /   Subscriber ID: [id number] - ([facility])      AMB Supply Order  Order Details  <<<[place] CPAP>>>     [facility] [place] Date: [place]22   The encounter diagnosis was VINICIO (obstructive sleep apnea).           (  X   )New Set-Up     CPAP machine   (     )[id number] CPAP Unit  (  x   ) Auto CPAP Unit  (     ) BiLevel Unit  (     ) Auto BiLevel Unit  (     ) ASV   (     ) Bilevel ST    (     ) Oxygen Concentrator         Length of need: 12 months    Pressure: 5-15  cmH20  EPR: 3     Starting Ramp Pressure:   cm H20  Ramp Time: min      Patient had a diagnostic Apnea Hypopnea Index (AHI) of :  18    *SUPPLIES* Replace all as needed, or per coverage guidelines     Masks Type:    (     ) -Full Face Mask (1 per 3 mon)  (    ) -Full Mask (1 per month) Interface/Cushion      (  x   ) -Nasal Mask (1 per 3 mon)  (     ) - Nasal Mask (1 per month) Interface/Cushion  ( x    ) -Pillow (2 per mon)  (     ) -Vpfofzfms (1 per 6 mon)      _________________________________________________________________          Other Supplies:    (  X   )-Uqcnijrk (1 per 6 mon)  ( X    )-Fqtpig Tubing (1 per 3 mon)  (  X   )- Disposable Filter (2 per mon)  (   X  )-Vpawaa Humidifier (1 per year)     (  x   )-Btreqnmzz (sometimes used with Full Face Mask) (1 per 6 mos)  (     )-Tubing-without heat (1 per 3 mos)  ( X   )-Non-Disposable Filter (1 per 6 mos)  (   x  )-Water Chamber (1 per 6 mos)  (     )-Humidifier non-heated (1 per 5 yrs)      Signed Date: 2022  Electronically Signed By: JUAN C Mandujano CNP    DME - [address] [facility]  Phone: [employer] [address] [address]  Dept: [phone]      Patient Name: Lola Joya  : 1965  Gender: female  Address: [address] [place] [address]   Patient phone: [phone] (home) [phone] (work)      Primary Insurance: Payor: Jess Salt / Plan: [address] / Product Type: *No Product type* /   Subscriber ID: [id number] - ([facility]ik [facility])      AMB Supply Order  Order Details     DME Location:[facility]   Order Date: 2022   The encounter diagnosis was VINICIO (obstructive sleep apnea).              (  X   )Supplies Needed       CPAP Machine   (     ) CPAP Unit  ( x  ) Auto CPAP Unit  (     ) BiLevel Unit  (     ) Auto BiLevel Unit  (     ) ASV        (     ) Bilevel ST      Length of need: 12 months    Pressure:  5-15 cmH20  EPR: 3    Starting Ramp Pressure:   cm H20  Ramp Time: min        Patient had a diagnostic Apnea Hypopnea Index (AHI) of :  18  *SUPPLIES* Replace all as needed, or per coverage guidelines     Masks Type:  (    ) -Full Face Mask (1 per 3 mon)  (    ) -Full Mask (1 per month) Interface/Cushion      ( x ) -Nasal Mask (1 per 3 mon)  (   ) - Nasal Mask (1 per month) Interface/Cushion  (  x   ) -Pillow (2 per mon)  (     ) -Nwomuegcx (1 per 6 mon)            Other Supplies:    (   X  )-Jrtrztwi (1 per 6 mon)  (   X  )-Zkkqjk Tubing (1 per 3 mon)  (   X )- Disposable Filter (2 per mon)  (   x  )-Mhudgk Humidifier (1 per year)     (  x   )-Onagyzkcl (sometimes used with Full Face Mask) (1 per 6 mos)  (    )-Tubing-without heat (1 per 3 mos)  (     )-Non-Disposable Filter (1 per 6 mos)  (  x   )-Water Chamber (1 per 6 mos)  (     )-Humidifier non-heated (1 per 5 yrs)      Signed Date: 8/22/2022  Electronically Signed By: JUAN C Guillory CNP  Electronically Dated:  8/22/2022             Collaborating Physician: Dr. Khoi Barber    Over 50% of today's office visit was spent in face to face time reviewing test results, prognosis, importance of compliance, education about disease process, benefits of medications, instructions for management of acute flare-ups, and follow up plans. Total face to face time spent with patient was 20 minutes.         JUAN C Guillory CNP  Electronically signed

## 2022-08-22 ENCOUNTER — OFFICE VISIT (OUTPATIENT)
Dept: SLEEP MEDICINE | Age: 57
End: 2022-08-22
Payer: COMMERCIAL

## 2022-08-22 VITALS
DIASTOLIC BLOOD PRESSURE: 74 MMHG | BODY MASS INDEX: 34.85 KG/M2 | OXYGEN SATURATION: 97 % | WEIGHT: 196.7 LBS | HEART RATE: 87 BPM | HEIGHT: 63 IN | RESPIRATION RATE: 15 BRPM | SYSTOLIC BLOOD PRESSURE: 116 MMHG | TEMPERATURE: 97.2 F

## 2022-08-22 DIAGNOSIS — G47.33 OSA (OBSTRUCTIVE SLEEP APNEA): Primary | ICD-10-CM

## 2022-08-22 PROCEDURE — 99213 OFFICE O/P EST LOW 20 MIN: CPT | Performed by: NURSE PRACTITIONER

## 2022-08-22 RX ORDER — NAPROXEN SODIUM 220 MG
220 TABLET ORAL 2 TIMES DAILY
COMMUNITY
Start: 2022-04-01

## 2022-08-22 RX ORDER — VALACYCLOVIR HYDROCHLORIDE 1 G/1
TABLET, FILM COATED ORAL
COMMUNITY
Start: 2022-08-18

## 2022-08-22 RX ORDER — KETOTIFEN FUMARATE 0.35 MG/ML
1 SOLUTION/ DROPS OPHTHALMIC 2 TIMES DAILY
COMMUNITY

## 2022-08-22 RX ORDER — ACETAMINOPHEN 500 MG
500 TABLET ORAL 2 TIMES DAILY
COMMUNITY
Start: 2022-04-01

## 2022-08-22 RX ORDER — TOPIRAMATE 25 MG/1
TABLET ORAL
COMMUNITY
Start: 2022-07-27

## 2022-08-22 RX ORDER — TRIAMCINOLONE ACETONIDE 55 UG/1
2 SPRAY, METERED NASAL DAILY
COMMUNITY

## 2022-08-22 RX ORDER — ESTRADIOL 10 UG/1
INSERT VAGINAL
COMMUNITY
Start: 2022-03-02

## 2022-08-22 ASSESSMENT — SLEEP AND FATIGUE QUESTIONNAIRES
HOW LIKELY ARE YOU TO NOD OFF OR FALL ASLEEP WHILE SITTING INACTIVE IN A PUBLIC PLACE: 0
HOW LIKELY ARE YOU TO NOD OFF OR FALL ASLEEP IN A CAR, WHILE STOPPED FOR A FEW MINUTES IN TRAFFIC: 0
HOW LIKELY ARE YOU TO NOD OFF OR FALL ASLEEP WHILE LYING DOWN TO REST IN THE AFTERNOON WHEN CIRCUMSTANCES PERMIT: 0
HOW LIKELY ARE YOU TO NOD OFF OR FALL ASLEEP WHILE SITTING QUIETLY AFTER LUNCH WITHOUT ALCOHOL: 0
ESS TOTAL SCORE: 0
HOW LIKELY ARE YOU TO NOD OFF OR FALL ASLEEP WHEN YOU ARE A PASSENGER IN A CAR FOR AN HOUR WITHOUT A BREAK: 0
HOW LIKELY ARE YOU TO NOD OFF OR FALL ASLEEP WHILE SITTING AND READING: 0
HOW LIKELY ARE YOU TO NOD OFF OR FALL ASLEEP WHILE WATCHING TV: 0
HOW LIKELY ARE YOU TO NOD OFF OR FALL ASLEEP WHILE SITTING AND TALKING TO SOMEONE: 0

## 2022-08-22 NOTE — PATIENT INSTRUCTIONS
Continue CPAP 5-15 cm H2O with nightly compliance  Supplies ordered  Follow up in 1 year or sooner if needed

## 2023-08-22 NOTE — PROGRESS NOTES
-Hagfosina (1 per 6 mon)            Other Supplies:    (   X  )-Rocqwnuu (1 per 6 mon)  (   X  )-Fpgalo Tubing (1 per 3 mon)  (   X  )- Disposable Filter (2 per mon)  (   x  )-Bbkmmq Humidifier (1 per year)     (  x   )-Bttrshbnz (sometimes used with Full Face Mask) (1 per 6 mos)  (    )-Tubing-without heat (1 per 3 mos)  (     )-Non-Disposable Filter (1 per 6 mos)  (  x   )-Water Chamber (1 per 6 mos)  (     )-Humidifier non-heated (1 per 5 yrs)      Signed Date: 8/23/2023  Electronically Signed By: JUAN C Mercer CNP  Electronically Dated:  8/23/2023             Collaborating Physician: Dr. Elizabeth Harden    Over 50% of today's office visit was spent in face to face time reviewing test results, prognosis, importance of compliance, education about disease process, benefits of medications, instructions for management of acute flare-ups, and follow up plans. Total face to face time spent with patient was 20 minutes.         JUAN C Mercer CNP  Electronically signed

## 2023-08-23 ENCOUNTER — OFFICE VISIT (OUTPATIENT)
Dept: SLEEP MEDICINE | Age: 58
End: 2023-08-23
Payer: COMMERCIAL

## 2023-08-23 VITALS
HEART RATE: 72 BPM | TEMPERATURE: 97.3 F | HEIGHT: 63 IN | DIASTOLIC BLOOD PRESSURE: 70 MMHG | WEIGHT: 210 LBS | BODY MASS INDEX: 37.21 KG/M2 | OXYGEN SATURATION: 99 % | SYSTOLIC BLOOD PRESSURE: 120 MMHG

## 2023-08-23 DIAGNOSIS — G47.33 OSA (OBSTRUCTIVE SLEEP APNEA): Primary | ICD-10-CM

## 2023-08-23 DIAGNOSIS — E66.9 OBESITY (BMI 30.0-34.9): ICD-10-CM

## 2023-08-23 PROCEDURE — 3074F SYST BP LT 130 MM HG: CPT | Performed by: NURSE PRACTITIONER

## 2023-08-23 PROCEDURE — 99213 OFFICE O/P EST LOW 20 MIN: CPT | Performed by: NURSE PRACTITIONER

## 2023-08-23 PROCEDURE — 3078F DIAST BP <80 MM HG: CPT | Performed by: NURSE PRACTITIONER

## 2023-08-23 RX ORDER — CRISABOROLE 20 MG/G
OINTMENT TOPICAL PRN
COMMUNITY

## 2023-08-23 RX ORDER — ESTRADIOL 0.07 MG/D
FILM, EXTENDED RELEASE TRANSDERMAL
COMMUNITY
Start: 2023-06-05

## 2023-08-23 RX ORDER — PROGESTERONE 100 MG/1
CAPSULE ORAL
COMMUNITY
Start: 2023-07-21

## 2023-08-23 ASSESSMENT — SLEEP AND FATIGUE QUESTIONNAIRES
HOW LIKELY ARE YOU TO NOD OFF OR FALL ASLEEP IN A CAR, WHILE STOPPED FOR A FEW MINUTES IN TRAFFIC: 0
HOW LIKELY ARE YOU TO NOD OFF OR FALL ASLEEP WHILE SITTING QUIETLY AFTER LUNCH WITHOUT ALCOHOL: 0
HOW LIKELY ARE YOU TO NOD OFF OR FALL ASLEEP WHILE SITTING INACTIVE IN A PUBLIC PLACE: 0
HOW LIKELY ARE YOU TO NOD OFF OR FALL ASLEEP WHILE SITTING AND TALKING TO SOMEONE: 0
HOW LIKELY ARE YOU TO NOD OFF OR FALL ASLEEP WHILE WATCHING TV: 1
ESS TOTAL SCORE: 4
HOW LIKELY ARE YOU TO NOD OFF OR FALL ASLEEP WHILE LYING DOWN TO REST IN THE AFTERNOON WHEN CIRCUMSTANCES PERMIT: 2
HOW LIKELY ARE YOU TO NOD OFF OR FALL ASLEEP WHILE SITTING AND READING: 1
HOW LIKELY ARE YOU TO NOD OFF OR FALL ASLEEP WHEN YOU ARE A PASSENGER IN A CAR FOR AN HOUR WITHOUT A BREAK: 0

## 2023-08-23 NOTE — PATIENT INSTRUCTIONS
Continue CPAP 5-15 cm H2O with nightly compliance  Supplies ordered  Recommendations as above  Follow up in 1 year or sooner if needed

## 2024-05-06 ENCOUNTER — PATIENT MESSAGE (OUTPATIENT)
Dept: SLEEP MEDICINE | Age: 59
End: 2024-05-06

## 2024-05-06 DIAGNOSIS — G47.33 OSA (OBSTRUCTIVE SLEEP APNEA): Primary | ICD-10-CM

## 2024-05-08 NOTE — TELEPHONE ENCOUNTER
From: Breana Joya  To: Jesus Ruiz  Sent: 5/6/2024 6:24 AM EDT  Subject: CPAP      Good morning! Sorry for delay. Per our conversation from April, would you please set me up for a home sleep study?    JUAN LUIS Toussaint

## 2024-05-08 NOTE — TELEPHONE ENCOUNTER
Home sleep study ordered to evaluate if VINICIO is resolved with weight loss or improved.    Orders Placed This Encounter    Ambulatory Referral to Sleep Studies     Referral Priority:   Routine     Referral Type:   Consult for Advice and Opinion     Referral Reason:   Specialty Services Required     Number of Visits Requested:   1

## 2024-06-05 ENCOUNTER — HOSPITAL ENCOUNTER (OUTPATIENT)
Dept: SLEEP CENTER | Age: 59
Discharge: HOME | End: 2024-06-08

## 2024-06-19 ENCOUNTER — PATIENT MESSAGE (OUTPATIENT)
Dept: SLEEP MEDICINE | Age: 59
End: 2024-06-19